# Patient Record
Sex: FEMALE | Employment: FULL TIME | URBAN - METROPOLITAN AREA
[De-identification: names, ages, dates, MRNs, and addresses within clinical notes are randomized per-mention and may not be internally consistent; named-entity substitution may affect disease eponyms.]

---

## 2017-02-09 ENCOUNTER — HOSPITAL ENCOUNTER (EMERGENCY)
Facility: CLINIC | Age: 42
Discharge: HOME | End: 2017-02-09
Attending: EMERGENCY MEDICINE | Admitting: EMERGENCY MEDICINE
Payer: COMMERCIAL

## 2017-02-09 ENCOUNTER — APPOINTMENT (OUTPATIENT)
Dept: GENERAL RADIOLOGY | Facility: CLINIC | Age: 42
End: 2017-02-09
Attending: EMERGENCY MEDICINE
Payer: COMMERCIAL

## 2017-02-09 VITALS
DIASTOLIC BLOOD PRESSURE: 84 MMHG | BODY MASS INDEX: 33.04 KG/M2 | HEIGHT: 61 IN | RESPIRATION RATE: 22 BRPM | SYSTOLIC BLOOD PRESSURE: 139 MMHG | TEMPERATURE: 98.9 F | OXYGEN SATURATION: 100 % | WEIGHT: 175 LBS | HEART RATE: 97 BPM

## 2017-02-09 DIAGNOSIS — J11.1 INFLUENZA-LIKE ILLNESS: ICD-10-CM

## 2017-02-09 LAB
FLUAV+FLUBV AG SPEC QL: NEGATIVE
FLUAV+FLUBV AG SPEC QL: NORMAL
SPECIMEN SOURCE: NORMAL

## 2017-02-09 PROCEDURE — 71020 XR CHEST 2 VW: CPT

## 2017-02-09 PROCEDURE — 87804 INFLUENZA ASSAY W/OPTIC: CPT | Performed by: EMERGENCY MEDICINE

## 2017-02-09 PROCEDURE — 99284 EMERGENCY DEPT VISIT MOD MDM: CPT | Performed by: EMERGENCY MEDICINE

## 2017-02-09 RX ORDER — CODEINE PHOSPHATE AND GUAIFENESIN 10; 100 MG/5ML; MG/5ML
2 SOLUTION ORAL EVERY 4 HOURS PRN
Qty: 120 ML | Refills: 0 | Status: SHIPPED | OUTPATIENT
Start: 2017-02-09 | End: 2019-12-02

## 2017-02-09 ASSESSMENT — ENCOUNTER SYMPTOMS
CHILLS: 1
ABDOMINAL PAIN: 1
SORE THROAT: 1
MYALGIAS: 1
COUGH: 1
VOMITING: 1

## 2017-02-09 NOTE — DISCHARGE INSTRUCTIONS
Discharge Instructions  Influenza-like Illness    You were diagnosed today with influenza like illness.  Influenza is a respiratory illness caused by influenza A or B viruses.  Influenza causes fever, headache, muscle aches, and fatigue.  These symptoms start one to four days after you have been around a person with this illness.  People with influenza commonly have a dry cough, sore throat, and a runny nose.   Influenza is spread through sneezing and coughing and can live on surfaces for several days.  It is usually contagious for 5 days but in some cases up to 10 days and often affects several family members. If you have a family member who is less than 2 years old, older than 65 years old, pregnant or has a serious medical condition, they should be seen right away by a physician to decide if they should take preventative medications.      Return to the Emergency Department if:    You have trouble breathing.    You develop pain in your chest.    You have signs of being dehydrated, such as dizziness or unable to urinate at least three times daily.    You feel confused.    You cannot stop vomiting or you cannot drink enough fluids.    In children, you should seek help if the child has any of the above or if child:    Has blue or purplish skin color.    Is so irritable that he or she does not want to be held.    Does not have tears when crying (in infants) or does not urinate at least three times daily.    Does not wake up easily.    Follow-up with your doctor if you are not improving after 5 days.    What can I do to help myself?    Rest.    Fluids -- Drink hydrating solutions such as Gatorade  or Pedialyte  as often as you can. If you are drinking enough, you should pass urine at least every eight hours.    Tylenol  (acetaminophen) and Advil  (ibuprofen) can relieve fever, headache, and muscle aches. Do not give aspirin to children under 18 years old.     Antiviral treatment -- Antiviral medicines do not make the  flu symptoms go away immediately.  They have only been shown to make the symptoms go away 12 to 24 hours sooner than they would without treatment.       Antibiotics -- Antibiotics are NOT useful for treating viral illnesses such as influenza. Antibiotics should only be used if there is a bacterial complication of the flu such as bacterial pneumonia, ear infection, or sinusitis.    Because you were diagnosed with a flu like illness you are very contagious.  This means you cannot work, attend school or  for at least 24 hours or until you no longer have a fever.  If you were given a prescription for medicine here today, be sure to read all of the information (including the package insert) that comes with your prescription.  This will include important information about the medicine, its side effects, and any warnings that you need to know about.  The pharmacist who fills the prescription can provide more information and answer questions you may have about the medicine.  If you have questions or concerns that the pharmacist cannot address, please call or return to the Emergency Department.   Opioid Medication Information    Pain medications are among the most commonly prescribed medicines, so we are including this information for all our patients. If you did not receive pain medication or get a prescription for pain medicine, you can ignore it.     You may have been given a prescription for an opioid (narcotic) pain medicine and/or have received a pain medicine while here in the Emergency Department. These medicines can make you drowsy or impaired. You must not drive, operate dangerous equipment, or engage in any other dangerous activities while taking these medications. If you drive while taking these medications, you could be arrested for DUI, or driving under the influence. Do not drink any alcohol while you are taking these medications.     Opioid pain medications can cause addiction. If you have a history of  chemical dependency of any type, you are at a higher risk of becoming addicted to pain medications.  Only take these prescribed medications to treat your pain when all other options have been tried. Take it for as short a time and as few doses as possible. Store your pain pills in a secure place, as they are frequently stolen and provide a dangerous opportunity for children or visitors in your house to start abusing these powerful medications. We will not replace any lost or stolen medicine.  As soon as your pain is better, you should flush all your remaining medication.     Many prescription pain medications contain Tylenol  (acetaminophen), including Vicodin , Tylenol #3 , Norco , Lortab , and Percocet .  You should not take any extra pills of Tylenol  if you are using these prescription medications or you can get very sick.  Do not ever take more than 3000 mg of acetaminophen in any 24 hour period.    All opioids tend to cause constipation. Drink plenty of water and eat foods that have a lot of fiber, such as fruits, vegetables, prune juice, apple juice and high fiber cereal.  Take a laxative if you don t move your bowels at least every other day. Miralax , Milk of Magnesia, Colace , or Senna  can be used to keep you regular.      Remember that you can always come back to the Emergency Department if you are not able to see your regular doctor in the amount of time listed above, if you get any new symptoms, or if there is anything that worries you.

## 2017-02-09 NOTE — ED AVS SNAPSHOT
[facility]    [address]    [facility] [address]    Phone:  [phone]    Fax:  [phone]                                       Mikki Knox   MRN: [id number]    Department:  [facility]   Date of Visit:  2/9/2017           After Visit Summary Signature Page     I have received my discharge instructions, and my questions have been answered. I have discussed any challenges I see with this plan with the nurse or doctor.    ..........................................................................................................................................  Patient/Patient Representative Signature      ..........................................................................................................................................  Patient Representative Print Name and Relationship to Patient    ..................................................               ................................................  Date                                            Time    ..........................................................................................................................................  Reviewed by Signature/Title    ...................................................              ..............................................  Date                                                            Time

## 2017-02-09 NOTE — ED NOTES
Cough for the past 4 days.  Vomiting with coughing.  Patient alert and oriented x3.  Airway, breathing and circulation intact.

## 2017-02-09 NOTE — ED AVS SNAPSHOT
[facility]    [address]    [facility] [address]    Phone:  [phone]    Fax:  [phone]                                       Mikki Knox   MRN: [id number]    Department:  [facility]   Date of Visit:  2/9/2017           Patient Information     Date Of Birth          1975        Your diagnoses for this visit were:     Influenza-like illness        You were seen by Francisco Chamberlain MD.      Follow-up Information     Follow up with [facility] In 3 days.    Specialties:  Sports Medicine, Pain Management, Obstetrics & Gynecology, Pediatrics, Internal Medicine, Nephrology    Contact information:    [address]  [facility] [address] [phone]        Discharge Instructions       Discharge Instructions  Influenza-like Illness    You were diagnosed today with influenza like illness.  Influenza is a respiratory illness caused by influenza A or B viruses.  Influenza causes fever, headache, muscle aches, and fatigue.  These symptoms start one to four days after you have been around a person with this illness.  People with influenza commonly have a dry cough, sore throat, and a runny nose.   Influenza is spread through sneezing and coughing and can live on surfaces for several days.  It is usually contagious for 5 days but in some cases up to 10 days and often affects several family members. If you have a family member who is less than 2 years old, older than 65 years old, pregnant or has a serious medical condition, they should be seen right away by a physician to decide if they should take preventative medications.      Return to the Emergency Department if:    You have trouble breathing.    You develop pain in your chest.    You have signs of being dehydrated, such as dizziness or unable to urinate at least three times daily.    You feel confused.    You cannot stop  vomiting or you cannot drink enough fluids.    In children, you should seek help if the child has any of the above or if child:    Has blue or purplish skin color.    Is so irritable that he or she does not want to be held.    Does not have tears when crying (in infants) or does not urinate at least three times daily.    Does not wake up easily.    Follow-up with your doctor if you are not improving after 5 days.    What can I do to help myself?    Rest.    Fluids -- Drink hydrating solutions such as Gatorade  or Pedialyte  as often as you can. If you are drinking enough, you should pass urine at least every eight hours.    Tylenol  (acetaminophen) and Advil  (ibuprofen) can relieve fever, headache, and muscle aches. Do not give aspirin to children under 18 years old.     Antiviral treatment -- Antiviral medicines do not make the flu symptoms go away immediately.  They have only been shown to make the symptoms go away 12 to 24 hours sooner than they would without treatment.       Antibiotics -- Antibiotics are NOT useful for treating viral illnesses such as influenza. Antibiotics should only be used if there is a bacterial complication of the flu such as bacterial pneumonia, ear infection, or sinusitis.    Because you were diagnosed with a flu like illness you are very contagious.  This means you cannot work, attend school or  for at least 24 hours or until you no longer have a fever.  If you were given a prescription for medicine here today, be sure to read all of the information (including the package insert) that comes with your prescription.  This will include important information about the medicine, its side effects, and any warnings that you need to know about.  The pharmacist who fills the prescription can provide more information and answer questions you may have about the medicine.  If you have questions or concerns that the pharmacist cannot address, please call or return to the Emergency  Department.   Opioid Medication Information    Pain medications are among the most commonly prescribed medicines, so we are including this information for all our patients. If you did not receive pain medication or get a prescription for pain medicine, you can ignore it.     You may have been given a prescription for an opioid (narcotic) pain medicine and/or have received a pain medicine while here in the Emergency Department. These medicines can make you drowsy or impaired. You must not drive, operate dangerous equipment, or engage in any other dangerous activities while taking these medications. If you drive while taking these medications, you could be arrested for DUI, or driving under the influence. Do not drink any alcohol while you are taking these medications.     Opioid pain medications can cause addiction. If you have a history of chemical dependency of any type, you are at a higher risk of becoming addicted to pain medications.  Only take these prescribed medications to treat your pain when all other options have been tried. Take it for as short a time and as few doses as possible. Store your pain pills in a secure place, as they are frequently stolen and provide a dangerous opportunity for children or visitors in your house to start abusing these powerful medications. We will not replace any lost or stolen medicine.  As soon as your pain is better, you should flush all your remaining medication.     Many prescription pain medications contain Tylenol  (acetaminophen), including Vicodin , Tylenol #3 , Norco , Lortab , and Percocet .  You should not take any extra pills of Tylenol  if you are using these prescription medications or you can get very sick.  Do not ever take more than 3000 mg of acetaminophen in any 24 hour period.    All opioids tend to cause constipation. Drink plenty of water and eat foods that have a lot of fiber, such as fruits, vegetables, prune juice, apple juice and high fiber cereal.   Take a laxative if you don t move your bowels at least every other day. Miralax , Milk of Magnesia, Colace , or Senna  can be used to keep you regular.      Remember that you can always come back to the Emergency Department if you are not able to see your regular doctor in the amount of time listed above, if you get any new symptoms, or if there is anything that worries you.        24 Hour Appointment Hotline       To make an appointment at any [facility], call [phone] ([phone]). If you don't have a family doctor or clinic, we will help you find one. [facility] clinics are conveniently located to serve the needs of you and your family.             Review of your medicines      START taking        Dose / Directions Last dose taken    guaiFENesin-codeine 100-10 MG/5ML Soln solution   Commonly known as:  ROBITUSSIN AC   Dose:  2 tsp.   Quantity:  120 mL        Take 10 mLs by mouth every 4 hours as needed for cough   Refills:  0          Our records show that you are taking the medicines listed below. If these are incorrect, please call your family doctor or clinic.        Dose / Directions Last dose taken    Adioso CONTOUR test strip   Quantity:  100 strip   Generic drug:  blood glucose monitoring        Use to test blood sugars 3 times daily or as directed.   Refills:  prn        bismuth subsalicylate 262 MG chewable tablet   Commonly known as:  PEPTO BISMOL   Dose:  262 mg   Quantity:  30 tablet        Take 1 tablet (262 mg) by mouth 4 times daily as needed   Refills:  0        ibuprofen 600 MG tablet   Commonly known as:  ADVIL/MOTRIN   Dose:  600 mg   Quantity:  120 tablet        Take 1 tablet by mouth 4 times daily.   Refills:  1        metFORMIN 1000 MG tablet   Commonly known as:  GLUCOPHAGE   Dose:  1000 mg   Quantity:  60 tablet        Take 1 tablet (1,000 mg) by mouth 2 times daily (with meals)   Refills:  0        metoclopramide 10 MG tablet   Commonly known as:  REGLAN   Dose:  10 mg    Quantity:  15 tablet        Take 1 tablet (10 mg) by mouth 4 times daily as needed   Refills:  0        triamcinolone 0.1 % ointment   Commonly known as:  KENALOG   Quantity:  15 g        Apply sparingly to affected area. Use up to three times a day to rash on right hand   Refills:  0                Prescriptions were sent or printed at these locations (1 Prescription)                   Other Prescriptions                Printed at Department/Unit printer (1 of 1)         guaiFENesin-codeine (ROBITUSSIN AC) 100-10 MG/5ML SOLN solution                Procedures and tests performed during your visit     Influenza A/B antigen    XR Chest 2 Views      Orders Needing Specimen Collection     None      Pending Results     No orders found from 2/8/2017 to 2/10/2017.            Pending Culture Results     No orders found from 2/8/2017 to 2/10/2017.       Test Results from your hospital stay           2/9/2017  3:46 PM - Interface, Flexilab Results      Component Results     Component Value Ref Range & Units Status    Influenza A/B Agn Specimen Nasal  Final    Influenza A Negative NEG Final    Influenza B  NEG Final    Negative   Test results must be correlated with clinical data. If necessary, results   should be confirmed by a molecular assay or viral culture.           2/9/2017  3:22 PM - Interface, Radiant Ib      Narrative     XR CHEST 2 VW 2/9/2017 3:20 PM    COMPARISON: None.    HISTORY: Chest pain        Impression     IMPRESSION: Cardiac silhouette and pulmonary vasculature are within  normal limits. No focal airspace disease, pleural effusion or  pneumothorax.    JOEY WHYTE                Clinical Quality Measure: Blood Pressure Screening     Your blood pressure was checked while you were in the emergency department today. The last reading we obtained was  BP: 139/84 mmHg . Please read the guidelines below about what these numbers mean and what you should do about them.  If your systolic blood pressure (the top  "number) is less than 120 and your diastolic blood pressure (the bottom number) is less than 80, then your blood pressure is normal. There is nothing more that you need to do about it.  If your systolic blood pressure (the top number) is 120-139 or your diastolic blood pressure (the bottom number) is 80-89, your blood pressure may be higher than it should be. You should have your blood pressure rechecked within a year by a primary care provider.  If your systolic blood pressure (the top number) is 140 or greater or your diastolic blood pressure (the bottom number) is 90 or greater, you may have high blood pressure. High blood pressure is treatable, but if left untreated over time it can put you at risk for heart attack, stroke, or kidney failure. You should have your blood pressure rechecked by a primary care provider within the next 4 weeks.  If your provider in the emergency department today gave you specific instructions to follow-up with your doctor or provider even sooner than that, you should follow that instruction and not wait for up to 4 weeks for your follow-up visit.        Thank you for choosing [facility]       Thank you for choosing [facility] for your care. Our goal is always to provide you with excellent care. Hearing back from our patients is one way we can continue to improve our services. Please take a few minutes to complete the written survey that you may receive in the mail after you visit with us. Thank you!        [employer] Information     [employer] lets you send messages to your doctor, view your test results, renew your prescriptions, schedule appointments and more. To sign up, go to [email or web address] . Click on \"Log in\" on the left side of the screen, which will take you to the Welcome page. Then click on \"Sign up Now\" on the right side of the page.     You will be asked to enter the access code listed below, as well as some personal information. Please follow the directions to create your " username and password.     Your access code is: [email or web address]  Expires: 3/7/2017  6:54 PM     Your access code will  in 90 days. If you need help or a new code, please call your [facility] clinic or [phone].        Care EveryWhere ID     This is your Care EveryWhere ID. This could be used by other organizations to access your [facility] medical records  [id number]        After Visit Summary       This is your record. Keep this with you and show to your community pharmacist(s) and doctor(s) at your next visit.

## 2017-02-09 NOTE — ED PROVIDER NOTES
"  History     Chief Complaint:  Cough    HPI   Mikki Knox is a 41 year old female with a PMH significant for diabetes who presents to the emergency department for evaluation of cough. The patient reports onset of a dry cough three days ago for which she has been taking OTC medications without significant relief. She also describes associated chills, myalgias, sore throat, ear fullness, and today onset of post-tussive emesis. The patient reports having pain to her chest and back whenever she coughs. She says her blood sugars have been fluctuating, but \"not out of control\", since starting the OTC medications. Her sugars normally are around 150 but have been as high as 245 lately.     Allergies:  No Known Drug Allergies    Medications:    Metformin  Reglan  Pepto bismol  Motrin    Past Medical History:    Diabetes mellitus  Hyperlipidemia  Ovarian cyst    Past Surgical History:    Appendectomy    Family History:    Diabetes  Hypertension  Cancer  Heart disease    Social History:   Tobacco use:    Negative  Alcohol use:    Positive  Marital status:      Accompanied to ED by:  Alone    Review of Systems   Constitutional: Positive for chills.   HENT: Positive for sore throat.         Positive for ear fullness.   Respiratory: Positive for cough.    Cardiovascular: Positive for chest pain.   Gastrointestinal: Positive for vomiting (post-tussive) and abdominal pain.   Musculoskeletal: Positive for myalgias.   All other systems reviewed and are negative.    Physical Exam   First Vitals:  BP: 139/84 mmHg  Pulse: 97  Heart Rate: 97  Temp: 98.9  F (37.2  C)  Resp: 22  Height: 154.9 cm (5' 1\")  Weight: 79.379 kg (175 lb)  SpO2: 100 %      Physical Exam  Constitutional: Alert, attentive  HENT:    Nose normal.    Posterior pharynx shows no erythema/edema   TMs clear bilaterally; normal external canals and external ears   Normal midline uvula   No peritonsillar erythema or swelling   No sublingual induration, swelling or " tenderness   No trismus   Normal dentition without gingival swelling or caries   Able to extend neck without discomfort   Tolerating secretions and able to breathe supine with ease  Eyes: EOM are normal. Pupils are equal, round, and reactive to light.   CV: regular rate and rhythm; no murmurs, rubs or gallups  Chest: Effort normal and breath sounds normal.   GI:  There is no tenderness. No distension. Normal bowel sounds  MSK: Normal range of motion.   Neurological: Alert, attentive  Skin: Skin is warm and dry.      Emergency Department Course     Imaging:  Radiographic findings were communicated with the patient who voiced understanding of the findings.  XR chest:  Cardiac silhouette and pulmonary vasculature are within normal limits. No focal airspace disease, pleural effusion or pneumothorax.  Radiology report.     Laboratory:  Influenza A/B Antigen: Negative     Emergency Department Course:  Nursing notes and vitals reviewed.   1448: I performed an exam of the patient as documented above..   The above workup was undertaken.   I personally reviewed the laboratory results with the Patient and answered all related questions prior to discharge.   Findings and plan explained to the Patient. Patient discharged home with instructions regarding supportive care, medications, and reasons to return. The importance of close follow-up was reviewed.     Impression & Plan      Medical Decision Making:  This is a very well appearing 41 year old female who presents with history and exam consistent with JELANI vs influenza vs PNA, with negative influenza swab. Given her chest pain and cough, CXR was performed and is negative. There is no evidence of acute otitis media. The patient is afebrile in the department. She is well-hydrated, well-appearing, and I believe is safe for discharge with plan for supportive care. The patient may take Tylenol or ibuprofen for pain and fever. Return if increasing pain, fever, difficulty breathing,  vomiting, or any other concerns. Follow-up with primary physician in 3-5 days.    Diagnosis:    ICD-10-CM    1. Influenza-like illness R69        Disposition:  Discharged to home.     Discharge Medications:  New Prescriptions    GUAIFENESIN-CODEINE (ROBITUSSIN AC) 100-10 MG/5ML SOLN SOLUTION    Take 10 mLs by mouth every 4 hours as needed for cough       IGordon, am serving as a scribe at 2:48 PM on 2/9/2017 to document services personally performed by Dr. Chamberlain, based on my observations and the provider's statements to me.  Gordon Ramirez  2/9/2017   [facility] EMERGENCY DEPARTMENT        Francisco Chamberlain MD  02/09/17 [id number]

## 2017-02-11 LAB
ALBUMIN SERPL-MCNC: 3.9 G/DL (ref 3.4–5)
ALP SERPL-CCNC: 66 U/L (ref 40–150)
ALT SERPL W P-5'-P-CCNC: 92 U/L (ref 0–50)
ANION GAP SERPL CALCULATED.3IONS-SCNC: 8 MMOL/L (ref 3–14)
AST SERPL W P-5'-P-CCNC: 59 U/L (ref 0–45)
BASOPHILS # BLD AUTO: 0.1 10E9/L (ref 0–0.2)
BASOPHILS NFR BLD AUTO: 1.1 %
BILIRUB SERPL-MCNC: 0.3 MG/DL (ref 0.2–1.3)
BUN SERPL-MCNC: 8 MG/DL (ref 7–30)
CALCIUM SERPL-MCNC: 8.9 MG/DL (ref 8.5–10.1)
CHLORIDE SERPL-SCNC: 100 MMOL/L (ref 94–109)
CO2 SERPL-SCNC: 28 MMOL/L (ref 20–32)
CREAT SERPL-MCNC: 0.6 MG/DL (ref 0.52–1.04)
DIFFERENTIAL METHOD BLD: NORMAL
EOSINOPHIL # BLD AUTO: 0.2 10E9/L (ref 0–0.7)
EOSINOPHIL NFR BLD AUTO: 3.2 %
ERYTHROCYTE [DISTWIDTH] IN BLOOD BY AUTOMATED COUNT: 11.9 % (ref 10–15)
GFR SERPL CREATININE-BSD FRML MDRD: ABNORMAL ML/MIN/1.7M2
GLUCOSE BLDC GLUCOMTR-MCNC: 277 MG/DL (ref 70–99)
GLUCOSE SERPL-MCNC: 248 MG/DL (ref 70–99)
HCT VFR BLD AUTO: 42.9 % (ref 35–47)
HGB BLD-MCNC: 14.7 G/DL (ref 11.7–15.7)
IMM GRANULOCYTES # BLD: 0 10E9/L (ref 0–0.4)
IMM GRANULOCYTES NFR BLD: 0.2 %
LYMPHOCYTES # BLD AUTO: 2.3 10E9/L (ref 0.8–5.3)
LYMPHOCYTES NFR BLD AUTO: 40.7 %
MCH RBC QN AUTO: 29 PG (ref 26.5–33)
MCHC RBC AUTO-ENTMCNC: 34.3 G/DL (ref 31.5–36.5)
MCV RBC AUTO: 85 FL (ref 78–100)
MONOCYTES # BLD AUTO: 0.7 10E9/L (ref 0–1.3)
MONOCYTES NFR BLD AUTO: 11.9 %
NEUTROPHILS # BLD AUTO: 2.4 10E9/L (ref 1.6–8.3)
NEUTROPHILS NFR BLD AUTO: 42.9 %
NRBC # BLD AUTO: 0 10*3/UL
NRBC BLD AUTO-RTO: 0 /100
PLATELET # BLD AUTO: 285 10E9/L (ref 150–450)
POTASSIUM SERPL-SCNC: 3.6 MMOL/L (ref 3.4–5.3)
PROT SERPL-MCNC: 7.8 G/DL (ref 6.8–8.8)
RBC # BLD AUTO: 5.07 10E12/L (ref 3.8–5.2)
SODIUM SERPL-SCNC: 136 MMOL/L (ref 133–144)
WBC # BLD AUTO: 5.6 10E9/L (ref 4–11)

## 2017-02-11 PROCEDURE — 96374 THER/PROPH/DIAG INJ IV PUSH: CPT

## 2017-02-11 PROCEDURE — 85025 COMPLETE CBC W/AUTO DIFF WBC: CPT | Performed by: EMERGENCY MEDICINE

## 2017-02-11 PROCEDURE — 25000128 H RX IP 250 OP 636: Performed by: EMERGENCY MEDICINE

## 2017-02-11 PROCEDURE — 80053 COMPREHEN METABOLIC PANEL: CPT | Performed by: EMERGENCY MEDICINE

## 2017-02-11 PROCEDURE — 00000146 ZZHCL STATISTIC GLUCOSE BY METER IP

## 2017-02-11 PROCEDURE — 99284 EMERGENCY DEPT VISIT MOD MDM: CPT

## 2017-02-11 RX ORDER — ONDANSETRON 2 MG/ML
4 INJECTION INTRAMUSCULAR; INTRAVENOUS EVERY 30 MIN PRN
Status: DISCONTINUED | OUTPATIENT
Start: 2017-02-11 | End: 2017-02-12 | Stop reason: HOSPADM

## 2017-02-11 RX ORDER — SODIUM CHLORIDE 9 MG/ML
1000 INJECTION, SOLUTION INTRAVENOUS CONTINUOUS
Status: DISCONTINUED | OUTPATIENT
Start: 2017-02-11 | End: 2017-02-12 | Stop reason: HOSPADM

## 2017-02-11 RX ADMIN — SODIUM CHLORIDE 1000 ML: 9 INJECTION, SOLUTION INTRAVENOUS at 23:36

## 2017-02-11 RX ADMIN — ONDANSETRON 4 MG: 2 INJECTION INTRAMUSCULAR; INTRAVENOUS at 23:36

## 2017-02-12 ENCOUNTER — HOSPITAL ENCOUNTER (EMERGENCY)
Facility: CLINIC | Age: 42
Discharge: HOME | End: 2017-02-12
Attending: EMERGENCY MEDICINE | Admitting: EMERGENCY MEDICINE
Payer: COMMERCIAL

## 2017-02-12 VITALS
WEIGHT: 174.82 LBS | TEMPERATURE: 98.6 F | BODY MASS INDEX: 33.05 KG/M2 | HEART RATE: 95 BPM | RESPIRATION RATE: 16 BRPM | SYSTOLIC BLOOD PRESSURE: 124 MMHG | OXYGEN SATURATION: 98 % | DIASTOLIC BLOOD PRESSURE: 90 MMHG

## 2017-02-12 DIAGNOSIS — E86.0 DEHYDRATION: ICD-10-CM

## 2017-02-12 DIAGNOSIS — K52.9 ACUTE GASTROENTERITIS: ICD-10-CM

## 2017-02-12 LAB
DEPRECATED S PYO AG THROAT QL EIA: NORMAL
MICRO REPORT STATUS: NORMAL
SPECIMEN SOURCE: NORMAL

## 2017-02-12 PROCEDURE — 25000128 H RX IP 250 OP 636: Performed by: EMERGENCY MEDICINE

## 2017-02-12 PROCEDURE — 87880 STREP A ASSAY W/OPTIC: CPT | Performed by: EMERGENCY MEDICINE

## 2017-02-12 PROCEDURE — 87081 CULTURE SCREEN ONLY: CPT | Performed by: EMERGENCY MEDICINE

## 2017-02-12 RX ADMIN — SODIUM CHLORIDE 1000 ML: 9 INJECTION, SOLUTION INTRAVENOUS at 01:18

## 2017-02-12 NOTE — ED NOTES
"Seen Thursday for cough, home with robitussin with codeine. Emesis x 6 today, concerned with blood glucose 300 at home. Pt states \"I nearly passed out while vomiting at my house\". ABCD's intact.   Blood sugar in triage = 277  "

## 2017-02-12 NOTE — ED PROVIDER NOTES
CHIEF COMPLAINT:  Sore throat, vomiting and diarrhea.      HISTORY OF PRESENT ILLNESS:  Mikki Knox is a pleasant 41-year-old female who presents with 2 days of cough.  She was seen in the emergency department several days ago on 02/02/2017 with negative chest x-ray, influenza and has been using over-the-counter cough suppressants.  She developed vomiting and diarrhea 1 day ago.  She has had a temperature as high as 101 degrees.  She feels dizzy with standing.  There has been no blood in her vomit or blood in her stool.  She is diabetic and her blood sugars have been running 300.  No rash.  No trauma.  No chest pain.  No shortness of breath.  She has no other complaints at this time.      PAST MEDICAL HISTORY:  Diabetes mellitus.      PAST SURGICAL HISTORY:  Appendectomy.      MEDICATIONS:     1.  Robitussin AC.   2.  Metformin.   3.  Reglan.   4.  Kenalog ointment.      ALLERGIES:  None.      SOCIAL HISTORY:  The patient presents to the emergency department with her .  She does not smoke.      REVIEW OF SYSTEMS:  A pertinent 10-point review of systems is negative except for that noted in the HPI.      PHYSICAL EXAMINATION:   IN GENERAL:  The patient is tired appearing, but appropriate with interaction.   VITAL SIGNS:  Blood pressure 140/90, heart rate 95, respiratory rate 18, oxygen 100% on room air, temperature 98.6 temporally.   HEENT:  Dry mucous membranes.  Pupils equal, round, reactive to light.  Her TMs are normal and her posterior oropharynx shows erythema without abscess or exudate.    NECK:  Full range of motion.   CARDIOVASCULAR:  Regular rhythm.  Normal rate.  No murmurs.   RESPIRATORY:  Clear to auscultation bilaterally without wheezes, rales or rhonchi.   GASTROINTESTINAL:  Soft, nondistended, nontender, normal bowel sounds.   MUSCULOSKELETAL:  Full range of motion of all extremities.   SKIN:  There are no pertinent skin findings.   NEUROLOGIC:  The patient is alert, oriented x4.  She has normal  strength.   PSYCHIATRIC:  The patient has a normal mood and affect.      LABORATORY EVALUATION AND DIAGNOSTIC STUDIES:    A Rapid Strep screen is negative.      A CBC shows a white blood cell count of 5.6, hemoglobin 14.7, platelets 285, otherwise unremarkable.  Comprehensive metabolic panel is normal with the exception of a slightly elevated glucose at 248 and ALT of 92 and AST of 59.      A bedside glucose reading was 277.      EMERGENCY DEPARTMENT COURSE AND MEDICAL DECISION MAKING:  This is a pleasant 41-year-old female who presents with vomiting and diarrheal illness consistent with gastroenteritis.  She is clinically dehydrated and after 2 liters of IV fluids using normal saline she feels much better.  She also received 4 mg of Zofran.  Strep is negative and with regards to her pharyngitis, this likely represents a virus.  No concern clinically for peritonsillar abscess, epiglottitis, Michael's angina or deep space tissue infection.  She will be discharged home with Zofran to use as needed with appropriate return precautions discussed.      DIAGNOSES:   1.  Acute gastroenteritis.   2.  Dehydration.   3.  Viral pharyngitis.      PLAN OF CARE:  Discharge home with primary care followup.         ASH COLEMAN MD             D: 2017 02:06   T: 2017 07:30   MT: LIZ#[id number]      Name:     MALIKA THOMPSON   MRN:      [id number]        Account:      [id number]   :      1975           Visit Date:   2017      Document: [id number]

## 2017-02-14 LAB
BACTERIA SPEC CULT: NORMAL
MICRO REPORT STATUS: NORMAL
SPECIMEN SOURCE: NORMAL

## 2018-05-20 ENCOUNTER — HOSPITAL ENCOUNTER (EMERGENCY)
Facility: CLINIC | Age: 43
Discharge: HOME | End: 2018-05-20
Attending: PHYSICIAN ASSISTANT | Admitting: PHYSICIAN ASSISTANT
Payer: COMMERCIAL

## 2018-05-20 VITALS
TEMPERATURE: 97.6 F | SYSTOLIC BLOOD PRESSURE: 161 MMHG | RESPIRATION RATE: 20 BRPM | DIASTOLIC BLOOD PRESSURE: 83 MMHG | HEART RATE: 100 BPM | OXYGEN SATURATION: 99 %

## 2018-05-20 DIAGNOSIS — R68.84 JAW PAIN: ICD-10-CM

## 2018-05-20 PROCEDURE — 25000132 ZZH RX MED GY IP 250 OP 250 PS 637: Performed by: PHYSICIAN ASSISTANT

## 2018-05-20 PROCEDURE — 99283 EMERGENCY DEPT VISIT LOW MDM: CPT | Mod: 25

## 2018-05-20 PROCEDURE — 64400 NJX AA&/STRD TRIGEMINAL NRV: CPT

## 2018-05-20 RX ORDER — PENICILLIN V POTASSIUM 500 MG/1
500 TABLET, FILM COATED ORAL 4 TIMES DAILY
Qty: 40 TABLET | Refills: 0 | Status: SHIPPED | OUTPATIENT
Start: 2018-05-20 | End: 2018-05-30

## 2018-05-20 RX ORDER — BUPIVACAINE HYDROCHLORIDE AND EPINEPHRINE 5; 5 MG/ML; UG/ML
1.8 INJECTION, SOLUTION PERINEURAL ONCE
Status: DISCONTINUED | OUTPATIENT
Start: 2018-05-20 | End: 2018-05-20 | Stop reason: HOSPADM

## 2018-05-20 RX ORDER — IBUPROFEN 600 MG/1
600 TABLET, FILM COATED ORAL ONCE
Status: COMPLETED | OUTPATIENT
Start: 2018-05-20 | End: 2018-05-20

## 2018-05-20 RX ORDER — HYDROCODONE BITARTRATE AND ACETAMINOPHEN 5; 325 MG/1; MG/1
2 TABLET ORAL ONCE
Status: COMPLETED | OUTPATIENT
Start: 2018-05-20 | End: 2018-05-20

## 2018-05-20 RX ADMIN — HYDROCODONE BITARTRATE AND ACETAMINOPHEN 2 TABLET: 5; 325 TABLET ORAL at 18:14

## 2018-05-20 RX ADMIN — IBUPROFEN 600 MG: 600 TABLET ORAL at 18:14

## 2018-05-20 NOTE — DISCHARGE INSTRUCTIONS
Discharge Instructions  Dental Pain    You have been seen today for a toothache. Your pain may be caused by an exposed nerve, an infection (pulpitis), a root abscess (pocket of pus), or other problems. You will need to see a dentist for a solution to your tooth problem. Emergency Department care is only to help control your problem until you can see a dentist; we cannot provide complete dental care.  Today, we did not find any sign that your toothache was caused by any dangerous or life-threatening condition, but sometimes symptoms develop over time and cannot be found during an emergency visit, so it is very important that you follow up with your dentist.      Generally, every Emergency Department visit should have a follow-up clinic visit with either a primary or a specialty clinic/provider. Please follow-up as instructed by your emergency provider today.    Return to the Emergency Department if:    You develop a new fever over 100.4 F.    You cannot open your mouth normally, cannot move your tongue well, or cannot swallow.    You have new or increased swelling of your face or neck.    You develop drainage of pus or foul smelling material from around your tooth.  What can I do to help myself?    Take any antibiotic the provider may have prescribed for you today.    Avoid very hot or very cold foods as both can cause pain.    Make an appointment to see a dentist as soon as possible. Dentists are generally not  on-staff  at hospitals so we cannot  refer  to you to dentist but we may be able to provide a list of dental clinics to help you.  If you were given a prescription for medicine here today, be sure to read all of the information (including the package insert) that comes with your prescription.  This will include important information about the medicine, its side effects, and any warnings that you need to know about.  The pharmacist who fills the prescription can provide more information and answer questions  you may have about the medicine.  If you have questions or concerns that the pharmacist cannot address, please call or return to the Emergency Department.   Remember that you can always come back to the Emergency Department if you are not able to see your regular provider in the amount of time listed above, if you get any new symptoms, or if there is anything that worries you.        Discharge Instructions  Dental Pain    You have been seen today for a toothache. Your pain may be caused by an exposed nerve, an infection (pulpitis), a root abscess, or other problems. You will need to see a dentist for a solution to your tooth problem. Emergency Department care is only to help control your problem until you can see a dentist.  Today, we did not find any sign that your toothache was caused by a serious condition, but sometimes symptoms develop over time and cannot be found during an emergency visit, so it is very important that you follow up with your dentist.      Return to the Emergency Department if:    You develop a fever over 101 degrees Fahrenheit    You can t open your mouth normally, can t move your tongue well, or can t swallow    You have new or increased swelling of your face or neck.    You develop drainage of pus or foul smelling material from around your tooth.  What can I do to help myself?    Take any antibiotic the doctor may have prescribed for you today.    Avoid very hot or very cold foods as both can cause pain.    Make an appointment to see a dentist as soon as possible. If you wish, we can provide you with a list of low-cost dental clinics.       Remember that you can always come back to the Emergency Department if you are not able to see your regular doctor in the amount of time listed above, if you get any new symptoms, or if there is anything that worries you.        Dental Resources  Name/Address/Phone Eligibility Hours Fee   [facility]  [address], [address]  [place] [address]  [phone] Anyone Call for appointment [facility]  Medical Assistance  Private Insurance   [facility]  [address] [place] [address]  [phone] Anyone Call for appointment    [facility] refers to low-cost dental clinics for non-preventive care    [place] Interpreters available Prices start at   Adults        Cleaning $36-$160        X-Ray $20-40  Children        Cleaning $15        X-ray $10-20        Fluoride $10  Accepts cash, check or credit;  Does not take insurance or MA.   [facility]  [address] [place]  [address]  [phone] Anyone Afternoons and evenings    September-May    Answers phones after 10 AM $30.00 per visit   ($15.00 per visit if 62 or older)   Preventive care.  Restoration care; sliding fee; MA   [facility]  [address] [place] [address]  [phone] Children birth to age 18 and pregnant women    [facility] Residents without insurance will be asked to apply for Assured Care. M TH F 8:30 am - 5 pm  T W 8:30 am - 7 pm    30 locations metro wide    Call for appointment and to confirm hours. Sliding Fee  [facility]  Medical Assistance  Assured Access  Private Insurance    8 Languages Spoken   [facility] [address] [address]  [phone] Anyone Call for appointment Sliding Fee  Accept insurance, [place],   [place] and self-pay.  Call if no insurance.    All services provided.  Staff fluent in Hmong, Laotian, Latvian, Kuwaiti, Chilean, Macanese, and Farsi.   [facility]  [address] [place] [address]  [phone] Children  Adults in a walk in basis Mon - Fri. 8 - 5 pm       (closed 1-2 pm)  General Dentists & Hygienists  Private Dentists  Dentures Fees based on family size and income ranging from 40% - 100% payment by patient.   [facility]  [address] [place]   [address] [phone] Anyone Mon - Fri 7:30 am - 5:00 pm  By Appt.    Tues & Wed @ 3:00 call for urgent care Appt for next day service Sliding fee:  MA; Insurance   [facility]  [address] [place]  [address]  [phone] Anyone Call for an appointment.  Days open vary every semester. Adult cleaning $25  Child cleaning $15  X-Rays $10-$15  Whitening services available  $75, includes cleaning  Seniors 50% off   [facility]  [address] [place]  [address]  [phone] Anyone M-F 8 am - 5 pm Most insurances accepted.  MA and Sliding Scale.   [facility]  [address] [place] [address]  [phone] Anyone without insurance Call to make appt   M-F 9:00 am - 5 pm   (Closed noon hour 12-1)    6 pm- 8 pm Evening hours also available for care Sliding fee based on income and size of household.   [facility]  [address] [place]  [address]  [phone] press option 1    For the [facility] press option 4 Anyone              Anyone Monday  4 - 6:30 pm  Tuesday 12:30 - 3 & 4-6:30  Thursday 8:30 - 11 am & 12-2:30 pm  September through May only    All year around on Thursdays from 5-9 pm (only time a dentist is in.) Cleanings & X-Rays Only  Cleanings:  Adults $30                   Kids $20  X-Rays:  Adults $34                Kids $10    MA and Sliding fee   [facility]  [address] [place] [address]    [phone] Anyone    ([employer] interpreters available) M-F 8:00 am - 5:00 pm       By appointment only  (same day appointments available) Sliding fee ($40+ may be due at appointment, remainder billed); MA; Insurance   [facility]  [address] [place] [address]    [phone]   Anyone    ([employer] interpreters available) M-Th 8:00 am - 8:00 pm  F 8:00 am - 5:00 pm    By appointment only  (same day  appointments available) Sliding fee ($40+ may be due at appointment, remainder billed); MA; Insurance   [facility]  [address] [place], [place]  [phone] Anyone    Must live within [facility] to qualify for sliding fee services Mon, Tues, Thurs, Fri  8:30 am - 5:00 pm  Wed. 8:30 am - 7:00 pm  All other services by appt. only Sliding Fee; MA   Offer payment plans    Have financial workers that will assist with [place] and will use sliding fee for those who do not qualify.      [facility]  [address] [place] [phone] Anyone without insurance     Hours and day of week vary  (Call ahead for hours)    Walk-in only Free Services    Cleanings; Fillings; Extractions   [facility]  [address] [place] [address]  [phone] Anyone Call for an appointment Accept patients with [place] and Medical Assistance.  10% discount if bill is paid in full on day of service.  No sliding fee scale.     [facility]  [address] [place], [place] [phone] Anyone M-F  8 am-5 pm  Call for appt.    Walk-in hours 8 am - 11am and 1 pm - 5 pm, however take scheduled appointments first    No emergency services or oral surgeries Sliding Fee available with an [place] or [place] denial letter and proof of income.    Accepts Assured Access card and MA coverage.     Name/Address/Phone Eligibility Hours Fee   [facility]  [address] [place]  [address] [phone] Anyone with emergencies only M & W clinic begins at 6 pm   Call ahead    Alternate Fridays for children by Appt only Free   [facility]  [address] [place] [address] [phone]    Emergencies (adults only):  [phone] Anyone Free walk-in screens for oral surgery    Call ahead for hours    All other services by appt. only  Accepts MA for pediatrics  only    Rates are about 25% - 40% less than private dental office.    No sliding fee scale   [facility]  [facility] [place], [place]  [phone] Anyone as long as they do not have health insurance Hours on Mondays, Tuesdays, and Thursdays Sliding fees based on monthly income    No root canals, tooth pulls or emergencies   [facility]  [address] [place] [phone] Anyone  M - F 8:00 am - 5:00 pm  Wed 8:00 am - 8 pm Sliding fees; MA; Insurance   [facility]  [address] [place] [address]  [phone] Anyone age 55+ M - F 8:00 am - 4:30 pm    Appt. only Set slightly lower fees;  MA; Insurance         Give Kids a smile day in [facility] Children Takes place in February at a few locations

## 2018-05-20 NOTE — LETTER
May 20, 2018      To Whom It May Concern:      Mikki Knox was seen in our Emergency Department today, 05/20/18.  I expect her condition to improve over the next day.  She may return to work/school when improved.    Sincerely,        Aide Esquivel RN

## 2018-05-20 NOTE — ED TRIAGE NOTES
Arrives with right upper dental pain since yesterday after drinking cold liquid that is radiating into her face and head, alert and oriented, ABCs intact.

## 2018-05-20 NOTE — ED AVS SNAPSHOT
[facility] Emergency Department    [address]    [facility] [address]    Phone:  [phone]    Fax:  [phone]                                       Mikki Knox   MRN: [id number]    Department:  [facility] Emergency Department   Date of Visit:  5/20/2018           Patient Information     Date Of Birth          1975        Your diagnoses for this visit were:     Jaw pain        You were seen by Bailey Bell PA-C.      Follow-up Information     Follow up with dentist In 1 day.    Why:  Recheck        Follow up with [facility] Emergency Department.    Specialty:  EMERGENCY MEDICINE    Why:  If symptoms worsen    Contact information:    [address]  [facility] [address] [phone]        Discharge Instructions       Discharge Instructions  Dental Pain    You have been seen today for a toothache. Your pain may be caused by an exposed nerve, an infection (pulpitis), a root abscess (pocket of pus), or other problems. You will need to see a dentist for a solution to your tooth problem. Emergency Department care is only to help control your problem until you can see a dentist; we cannot provide complete dental care.  Today, we did not find any sign that your toothache was caused by any dangerous or life-threatening condition, but sometimes symptoms develop over time and cannot be found during an emergency visit, so it is very important that you follow up with your dentist.      Generally, every Emergency Department visit should have a follow-up clinic visit with either a primary or a specialty clinic/provider. Please follow-up as instructed by your emergency provider today.    Return to the Emergency Department if:    You develop a new fever over 100.4 F.    You cannot open your mouth normally, cannot move your tongue well, or cannot swallow.    You have new or increased swelling of your face or neck.    You develop drainage of  pus or foul smelling material from around your tooth.  What can I do to help myself?    Take any antibiotic the provider may have prescribed for you today.    Avoid very hot or very cold foods as both can cause pain.    Make an appointment to see a dentist as soon as possible. Dentists are generally not  on-staff  at hospitals so we cannot  refer  to you to dentist but we may be able to provide a list of dental clinics to help you.  If you were given a prescription for medicine here today, be sure to read all of the information (including the package insert) that comes with your prescription.  This will include important information about the medicine, its side effects, and any warnings that you need to know about.  The pharmacist who fills the prescription can provide more information and answer questions you may have about the medicine.  If you have questions or concerns that the pharmacist cannot address, please call or return to the Emergency Department.   Remember that you can always come back to the Emergency Department if you are not able to see your regular provider in the amount of time listed above, if you get any new symptoms, or if there is anything that worries you.        Discharge Instructions  Dental Pain    You have been seen today for a toothache. Your pain may be caused by an exposed nerve, an infection (pulpitis), a root abscess, or other problems. You will need to see a dentist for a solution to your tooth problem. Emergency Department care is only to help control your problem until you can see a dentist.  Today, we did not find any sign that your toothache was caused by a serious condition, but sometimes symptoms develop over time and cannot be found during an emergency visit, so it is very important that you follow up with your dentist.      Return to the Emergency Department if:    You develop a fever over 101 degrees Fahrenheit    You can t open your mouth normally, can t move your tongue  well, or can t swallow    You have new or increased swelling of your face or neck.    You develop drainage of pus or foul smelling material from around your tooth.  What can I do to help myself?    Take any antibiotic the doctor may have prescribed for you today.    Avoid very hot or very cold foods as both can cause pain.    Make an appointment to see a dentist as soon as possible. If you wish, we can provide you with a list of low-cost dental clinics.       Remember that you can always come back to the Emergency Department if you are not able to see your regular doctor in the amount of time listed above, if you get any new symptoms, or if there is anything that worries you.        Dental Resources  Name/Address/Phone Eligibility Hours Fee   [facility]  [address], [address]  [place] [address]  [phone] Anyone Call for appointment [place]  Medical Assistance  Private Insurance   [facility]  [address] [place], [place] [phone] Anyone Call for appointment    [facility]"" refers to low-cost dental clinics for non-preventive care    Congolese Interpreters available Prices start at   Adults        Cleaning $36-$160        X-Ray $20-40  Children        Cleaning $15        X-ray $10-20        Fluoride $10  Accepts cash, check or credit;  Does not take insurance or MA.   [facility]  [address] [place]  [address]  [phone] Anyone Afternoons and evenings    September-May    Answers phones after 10 AM $30.00 per visit   ($15.00 per visit if 62 or older)   Preventive care.  Restoration care; sliding fee; MA   [facility]  [address] [place] [address]  [phone] Children birth to age 18 and pregnant women    [facility] Residents without insurance will be asked to apply for Assured Care. M TH F 8:30 am - 5 pm  T W 8:30 am - 7 pm    30 locations metro wide    Call for  appointment and to confirm hours. Sliding Fee  [place]  Medical Assistance  Assured Access  Private Insurance    8 Languages Spoken   [facility]  [address] [place] [address]  [phone] Anyone Call for appointment Sliding Fee  Accept insurance, [place],   [place] and self-pay.  Call if no insurance.    All services provided.  Staff fluent in Hmong, Laotian, Turkish, Italian, Emirati, Micronesian, and Farsi.   [facility]  [address] [place] [address]  [phone] Children  Adults in a walk in basis Mon - Fri. 8 - 5 pm       (closed 1-2 pm)  General Dentists & Hygienists  Private Dentists  Dentures Fees based on family size and income ranging from 40% - 100% payment by patient.   [facility]  [address] [place]  [address]    [phone] Anyone Mon - Fri 7:30 am - 5:00 pm  By Appt.    Tues & Wed @ 3:00 call for urgent care Appt for next day service Sliding fee:  MA; Insurance   [facility]  [address] [place]  [address]  [phone] Anyone Call for an appointment.  Days open vary every semester. Adult cleaning $25  Child cleaning $15  X-Rays $10-$15  Whitening services available  $75, includes cleaning  Seniors 50% off   [facility]  [address] [place]  [address]  [phone] Anyone M-F 8 am - 5 pm Most insurances accepted.  MA and Sliding Scale.   [facility]  [address] [place] [address]  [phone] Anyone without insurance Call to make appt   M-F 9:00 am - 5 pm   (Closed noon hour 12-1)    6 pm- 8 pm Evening hours also available for care Sliding fee based on income and size of household.   [facility]  [address] [place]  [address]  [phone] press option 1    For the [facility] press option 4 Anyone              Anyone  Monday  4 - 6:30 pm  Tuesday 12:30 - 3 & 4-6:30  Thursday 8:30 - 11 am & 12-2:30 pm  September through May only    All year around on Thursdays from 5-9 pm (only time a dentist is in.) Cleanings & X-Rays Only  Cleanings:  Adults $30                   Kids $20  X-Rays:  Adults $34                Kids $10    MA and Sliding fee   [facility]  [address] [place] [address]    [phone] Anyone    ([employer] interpreters available) M-F 8:00 am - 5:00 pm       By appointment only  (same day appointments available) Sliding fee ($40+ may be due at appointment, remainder billed); MA; Insurance   [facility]  [address] [place] [address]    [phone]   Anyone    ([employer] interpreters available) M-Th 8:00 am - 8:00 pm  F 8:00 am - 5:00 pm    By appointment only  (same day appointments available) Sliding fee ($40+ may be due at appointment, remainder billed); MA; Insurance   [facility]  [address] [place], [place]  [phone] Anyone    Must live within [facility] to qualify for sliding fee services Mon, Tues, Thurs, Fri  8:30 am - 5:00 pm  Wed. 8:30 am - 7:00 pm  All other services by appt. only Sliding Fee; MA   Offer payment plans    Have financial workers that will assist with [place] and will use sliding fee for those who do not qualify.      [facility]  [address] [place] [phone] Anyone without insurance     Hours and day of week vary  (Call ahead for hours)    Walk-in only Free Services    Cleanings; Fillings; Extractions   The [address]  [address] [phone] Anyone Call for an appointment Accept patients with [place] and Medical Assistance.  10% discount if bill is paid in full on day of service.  No sliding fee scale.     [facility]  [address] [place], [place] [phone] Anyone  M-F  8 am-5 pm  Call for appt.    Walk-in hours 8 am - 11am and 1 pm - 5 pm, however take scheduled appointments first    No emergency services or oral surgeries Sliding Fee available with an [place] or [place] denial letter and proof of income.    Accepts Assured Access card and [place] coverage.     Name/Address/Phone Eligibility Hours Fee   [facility]  [address] [place], [place]  [phone] Anyone with emergencies only M & W clinic begins at 6 pm   Call ahead    Alternate Fridays for children by Appt only Free   [facility]  [address] [place], [place] [address] [phone]    Emergencies (adults only):  [phone] Anyone Free walk-in screens for oral surgery    Call ahead for hours    All other services by appt. only  Accepts MA for pediatrics only    Rates are about 25% - 40% less than private dental office.    No sliding fee [facility] [address]  [phone] Anyone as long as they do not have health insurance Hours on Mondays, Tuesdays, and Thursdays Sliding fees based on monthly income    No root canals, tooth pulls or emergencies   [facility] [address] [address] [phone] Anyone  M - F 8:00 am - 5:00 pm  Wed 8:00 am - 8 pm Sliding fees; [place]; Insurance   [facility] [address]  [phone] Anyone age 55+ M - F 8:00 am - 4:30 pm    Appt. only Set slightly lower fees;  MA; Insurance         Give Kids a smile day in [facility] Children Takes place in February at a few locations           24 Hour Appointment Hotline       To make an appointment at any [facility], call [phone]). If you don't have a family doctor or clinic, we will help you find one. [facility] clinics are conveniently located to serve the needs of you and your family.             Review of your medicines       START taking        Dose / Directions Last dose taken    penicillin V potassium 500 MG tablet   Commonly known as:  VEETID   Dose:  500 mg   Quantity:  40 tablet        Take 1 tablet (500 mg) by mouth 4 times daily for 10 days   Refills:  0          Our records show that you are taking the medicines listed below. If these are incorrect, please call your family doctor or clinic.        Dose / Directions Last dose taken    guaiFENesin-codeine 100-10 MG/5ML Soln solution   Commonly known as:  ROBITUSSIN AC   Dose:  2 tsp.   Quantity:  120 mL        Take 10 mLs by mouth every 4 hours as needed for cough   Refills:  0        metFORMIN 1000 MG tablet   Commonly known as:  GLUCOPHAGE   Dose:  1000 mg   Quantity:  60 tablet        Take 1 tablet (1,000 mg) by mouth 2 times daily (with meals)   Refills:  0        metoclopramide 10 MG tablet   Commonly known as:  REGLAN   Dose:  10 mg   Quantity:  15 tablet        Take 1 tablet (10 mg) by mouth 4 times daily as needed   Refills:  0        triamcinolone 0.1 % ointment   Commonly known as:  KENALOG   Quantity:  15 g        Apply sparingly to affected area. Use up to three times a day to rash on right hand   Refills:  0                Prescriptions were sent or printed at these locations (1 Prescription)                   Other Prescriptions                Printed at Department/Unit printer (1 of 1)         penicillin V potassium (VEETID) 500 MG tablet                Orders Needing Specimen Collection     None      Pending Results     No orders found from 5/18/2018 to 5/21/2018.            Pending Culture Results     No orders found from 5/18/2018 to 5/21/2018.            Pending Results Instructions     If you had any lab results that were not finalized at the time of your Discharge, you can call the ED Lab Result RN at [phone]. You will be contacted by this team for any positive Lab results or changes in treatment. The nurses are available 7 days a week from 10A to  6:30P.  You can leave a message 24 hours per day and they will return your call.        Test Results From Your Hospital Stay               Clinical Quality Measure: Blood Pressure Screening     Your blood pressure was checked while you were in the emergency department today. The last reading we obtained was  BP: 161/83 . Please read the guidelines below about what these numbers mean and what you should do about them.  If your systolic blood pressure (the top number) is less than 120 and your diastolic blood pressure (the bottom number) is less than 80, then your blood pressure is normal. There is nothing more that you need to do about it.  If your systolic blood pressure (the top number) is 120-139 or your diastolic blood pressure (the bottom number) is 80-89, your blood pressure may be higher than it should be. You should have your blood pressure rechecked within a year by a primary care provider.  If your systolic blood pressure (the top number) is 140 or greater or your diastolic blood pressure (the bottom number) is 90 or greater, you may have high blood pressure. High blood pressure is treatable, but if left untreated over time it can put you at risk for heart attack, stroke, or kidney failure. You should have your blood pressure rechecked by a primary care provider within the next 4 weeks.  If your provider in the emergency department today gave you specific instructions to follow-up with your doctor or provider even sooner than that, you should follow that instruction and not wait for up to 4 weeks for your follow-up visit.        Thank you for choosing [facility]       Thank you for choosing [facility] for your care. Our goal is always to provide you with excellent care. Hearing back from our patients is one way we can continue to improve our services. Please take a few minutes to complete the written survey that you may receive in the mail after you visit with us. Thank you!        MyChart Information      "[employer] lets you send messages to your doctor, view your test results, renew your prescriptions, schedule appointments and more. To sign up, go to [email or web address] . Click on \"Log in\" on the left side of the screen, which will take you to the Welcome page. Then click on \"Sign up Now\" on the right side of the page.     You will be asked to enter the access code listed below, as well as some personal information. Please follow the directions to create your username and password.     Your access code is: [email or web address]  Expires: 2018  6:11 PM     Your access code will  in 90 days. If you need help or a new code, please call your [facility] clinic or [phone].        Care EveryWhere ID     This is your Care EveryWhere ID. This could be used by other organizations to access your [facility] medical records  [id number]        Equal Access to Services     CATERINA CHUN AH: Hadii wes correao Somaral, waaxda lushilpa, qaybta kaalmada adeannette, salvador shook . So [facility] [phone].    ATENCIÓN: Si habla español, tiene a duval disposición servicios gratuitos de asistencia lingüística. Llame al [phone].    We comply with applicable federal civil rights laws and [place] laws. We do not discriminate on the basis of race, color, national origin, age, disability, sex, sexual orientation, or gender identity.            After Visit Summary       This is your record. Keep this with you and show to your community pharmacist(s) and doctor(s) at your next visit.                  "

## 2018-05-20 NOTE — ED PROVIDER NOTES
History     Chief Complaint:  Dental Pain     FRANCOISE Knox is a 42 year old female who presents  for evaluation of right upper dental pain.  The pain is located in her right upper incisor and first premolar. She states the pain started yesterday and today it severely worsened.  She now states the pain is radiating to her face.  She states last night she felt hot, but denies taking her temperature.  She denies any fever, numbness/tingling, difficulty swallowing, tolerating her secretions, breathing, or any other concern.  She has been taking Tylenol and Orajel with relief.  She denies taking any ibuprofen.  She has not seen a dentist in over 6 months.  She does feel confident in her ability to get into her dental clinic tomorrow.  No other acute concerns.     Allergies:  No known drug allergies.     Medications:    Metformin    Past Medical History:    DM      Past Surgical History:    Appendectomy    Family History:    family history includes Breast Cancer in her mother; DIABETES in her mother and paternal grandmother; HEART DISEASE in her maternal aunt and paternal grandmother; Hypertension in her mother.    Social History:   reports that she has never smoked. She has never used smokeless tobacco. She reports that she drinks alcohol. She reports that she does not use illicit drugs.    PCP: No Ref-Primary, Physician     Review of Systems   HENT: Positive for dental problem.    All other systems reviewed and are negative.      Physical Exam     Patient Vitals for the past 24 hrs:   BP Temp Temp src Pulse Resp SpO2   05/20/18 1724 161/83 97.6  F (36.4  C) Temporal 100 20 99 %        Physical Exam  General: Resting comfortably.  Alert and oriented.   Head:  The scalp, face, and head appear normal   Eyes:  Conjunctivae and sclerae are normal    ENT:    The oropharynx is normal    Uvula is in the midline     Structures are symmetric    Tenderness palpation of right upper incisor and first premolar.    Diffuse  dental caries noted throughout.      No abscess amenable to drainage.    No sublingual swelling.     Neck:  No facial or neck swelling.      No lymphadenopathy  CV:  Regular rate and rhythm     Normal S1/S2    No pathological murmur detected   Resp:  Lungs are clear to auscultation    Non-labored    No rales or wheezing   MS:  Normal muscular tone   Skin:  No rash or acute skin lesions noted   Neuro: Speech is normal and fluent.     Emergency Department Course     Procedures:    Dental Block     INDICATIONS: Dental pain    ANESTHESIA: Middle superior alveolar nerve block using dental marcaine.     PATIENT STATUS: The patient tolerated the procedure well and there were no immediate complications.      Interventions:  Medications   ibuprofen (ADVIL/MOTRIN) tablet 600 mg (600 mg Oral Given 5/20/18 1814)   HYDROcodone-acetaminophen (NORCO) 5-325 MG per tablet 2 tablet (2 tablets Oral Given 5/20/18 1814)      Emergency Department Course:  Past medical records, nursing notes, and vitals reviewed.  I performed an exam of the patient and obtained history, as documented above.  Patient was given the above medications.  A dental block performed as above.  Patient be discharged home.  She is asked to follow-up with a dentist tomorrow.  She was discharged home with prescription for ibuprofen and penicillin.  She is asked to return immediately for trouble swallowing/breathing, tolerating her secretions, fever, or facial/neck swelling, or any other concern.  All questions were answered prior to discharge.  Patient understands and agrees to this plan.    Impression & Plan      Medical Decision Making:  Mikki Knox is a 42 year old female who presents for evaluation of jaw pain.  This appears to be secondary to a dental issue. There is no abscess detected around the tooth amenable to incision and drainage. The differential diagnosis includes: cracked tooth syndrome, pulpitis, sub-apical abscess, facial cellulitis, alveolitis  amongst others. There is no evidence of deep space infections, significant facial swelling, Lemierre's Syndrome or Michael's angina. There are no posterior pharyngeal space (RPA, PTA) infections detected. A dental block was performed as above with vast improvement in symptoms. The patient was given Ibuprofen and Norco prior to discharge. I think she is safe to be discharged home. She was discharged home with a prescription for Ibuprofen and Penicillin. The importance of dental follow up was stressed during the encounter.  She plans to follow-up in her dental clinic tomorrow.  Patient was also given additional dental clinic numbers to contact if needed she cannot get into a regular dental clinic.  She will return immediately for facial/neck swelling, trouble breathing, trouble tolerating her secretions, trismus, fever, or any other concern.  All questions were answered prior to discharge.  The patient understands and agrees to this plan.      Diagnosis:  Dental Pain    Plan:  Ibuprofen  Penicillin  F/U with Dentist in 1-2 days  Return to ED with any worsening symptoms     Diagnosis:    ICD-10-CM    1. Jaw pain R68.84         Discharge Medications:  Discharge Medication List as of 5/20/2018  6:39 PM      START taking these medications    Details   penicillin V potassium (VEETID) 500 MG tablet Take 1 tablet (500 mg) by mouth 4 times daily for 10 days, Disp-40 tablet, R-0, Local Print            Scribe Disclosure:  I, Donovan Medina, am serving as a scribe at 9:34 PM on 5/20/2018 to document services personally performed by Bailey Bell PA*, based on my observations and the provider's statements to me.        Bailey Bell PA-C  05/20/18 7607

## 2018-05-20 NOTE — ED AVS SNAPSHOT
[facility] Emergency Department    [address]    [facility] [address]    Phone:  [phone]    Fax:  [phone]                                       Mikki Knox   MRN: [id number]    Department:  [facility] Emergency Department   Date of Visit:  5/20/2018           After Visit Summary Signature Page     I have received my discharge instructions, and my questions have been answered. I have discussed any challenges I see with this plan with the nurse or doctor.    ..........................................................................................................................................  Patient/Patient Representative Signature      ..........................................................................................................................................  Patient Representative Print Name and Relationship to Patient    ..................................................               ................................................  Date                                            Time    ..........................................................................................................................................  Reviewed by Signature/Title    ...................................................              ..............................................  Date                                                            Time

## 2019-12-02 ENCOUNTER — APPOINTMENT (OUTPATIENT)
Dept: GENERAL RADIOLOGY | Facility: CLINIC | Age: 44
DRG: 494 | End: 2019-12-02
Attending: ORTHOPAEDIC SURGERY
Payer: OTHER MISCELLANEOUS

## 2019-12-02 ENCOUNTER — APPOINTMENT (OUTPATIENT)
Dept: GENERAL RADIOLOGY | Facility: CLINIC | Age: 44
DRG: 494 | End: 2019-12-02
Attending: INTERNAL MEDICINE
Payer: OTHER MISCELLANEOUS

## 2019-12-02 ENCOUNTER — ANESTHESIA (OUTPATIENT)
Dept: SURGERY | Facility: CLINIC | Age: 44
DRG: 494 | End: 2019-12-02
Payer: OTHER MISCELLANEOUS

## 2019-12-02 ENCOUNTER — ANESTHESIA EVENT (OUTPATIENT)
Dept: SURGERY | Facility: CLINIC | Age: 44
DRG: 494 | End: 2019-12-02
Payer: OTHER MISCELLANEOUS

## 2019-12-02 ENCOUNTER — APPOINTMENT (OUTPATIENT)
Dept: CT IMAGING | Facility: CLINIC | Age: 44
DRG: 494 | End: 2019-12-02
Attending: INTERNAL MEDICINE
Payer: OTHER MISCELLANEOUS

## 2019-12-02 ENCOUNTER — HOSPITAL ENCOUNTER (INPATIENT)
Facility: CLINIC | Age: 44
LOS: 2 days | Discharge: HOME | DRG: 494 | End: 2019-12-04
Attending: INTERNAL MEDICINE | Admitting: ORTHOPAEDIC SURGERY
Payer: OTHER MISCELLANEOUS

## 2019-12-02 DIAGNOSIS — E11.9 TYPE 2 DIABETES MELLITUS WITHOUT COMPLICATION, WITH LONG-TERM CURRENT USE OF INSULIN (H): ICD-10-CM

## 2019-12-02 DIAGNOSIS — Z79.4 TYPE 2 DIABETES MELLITUS WITHOUT COMPLICATION, WITH LONG-TERM CURRENT USE OF INSULIN (H): ICD-10-CM

## 2019-12-02 DIAGNOSIS — S93.04XA DISLOCATION OF RIGHT ANKLE JOINT, INITIAL ENCOUNTER: ICD-10-CM

## 2019-12-02 DIAGNOSIS — S82.841A ANKLE FRACTURE, BIMALLEOLAR, CLOSED, RIGHT, INITIAL ENCOUNTER: Primary | ICD-10-CM

## 2019-12-02 DIAGNOSIS — S82.851A CLOSED TRIMALLEOLAR FRACTURE OF RIGHT ANKLE, INITIAL ENCOUNTER: ICD-10-CM

## 2019-12-02 PROBLEM — S82.891A CLOSED RIGHT ANKLE FRACTURE: Status: ACTIVE | Noted: 2019-12-02

## 2019-12-02 LAB
ANION GAP SERPL CALCULATED.3IONS-SCNC: 8 MMOL/L (ref 3–14)
BASOPHILS # BLD AUTO: 0 10E9/L (ref 0–0.2)
BASOPHILS NFR BLD AUTO: 0.3 %
BUN SERPL-MCNC: 9 MG/DL (ref 7–30)
CALCIUM SERPL-MCNC: 8.6 MG/DL (ref 8.5–10.1)
CHLORIDE SERPL-SCNC: 105 MMOL/L (ref 94–109)
CO2 SERPL-SCNC: 23 MMOL/L (ref 20–32)
CREAT SERPL-MCNC: 0.51 MG/DL (ref 0.52–1.04)
DIFFERENTIAL METHOD BLD: ABNORMAL
EOSINOPHIL # BLD AUTO: 0 10E9/L (ref 0–0.7)
EOSINOPHIL NFR BLD AUTO: 0.1 %
ERYTHROCYTE [DISTWIDTH] IN BLOOD BY AUTOMATED COUNT: 12.4 % (ref 10–15)
GFR SERPL CREATININE-BSD FRML MDRD: >90 ML/MIN/{1.73_M2}
GLUCOSE BLDC GLUCOMTR-MCNC: 209 MG/DL (ref 70–99)
GLUCOSE BLDC GLUCOMTR-MCNC: 268 MG/DL (ref 70–99)
GLUCOSE BLDC GLUCOMTR-MCNC: 296 MG/DL (ref 70–99)
GLUCOSE BLDC GLUCOMTR-MCNC: 301 MG/DL (ref 70–99)
GLUCOSE BLDC GLUCOMTR-MCNC: 304 MG/DL (ref 70–99)
GLUCOSE SERPL-MCNC: 331 MG/DL (ref 70–99)
HCG SERPL QL: NEGATIVE
HCT VFR BLD AUTO: 43.7 % (ref 35–47)
HGB BLD-MCNC: 14.8 G/DL (ref 11.7–15.7)
IMM GRANULOCYTES # BLD: 0 10E9/L (ref 0–0.4)
IMM GRANULOCYTES NFR BLD: 0.3 %
LYMPHOCYTES # BLD AUTO: 1 10E9/L (ref 0.8–5.3)
LYMPHOCYTES NFR BLD AUTO: 8.7 %
MCH RBC QN AUTO: 29.7 PG (ref 26.5–33)
MCHC RBC AUTO-ENTMCNC: 33.9 G/DL (ref 31.5–36.5)
MCV RBC AUTO: 88 FL (ref 78–100)
MONOCYTES # BLD AUTO: 0.5 10E9/L (ref 0–1.3)
MONOCYTES NFR BLD AUTO: 4.1 %
NEUTROPHILS # BLD AUTO: 10.1 10E9/L (ref 1.6–8.3)
NEUTROPHILS NFR BLD AUTO: 86.5 %
NRBC # BLD AUTO: 0 10*3/UL
NRBC BLD AUTO-RTO: 0 /100
PLATELET # BLD AUTO: 291 10E9/L (ref 150–450)
POTASSIUM SERPL-SCNC: 3.9 MMOL/L (ref 3.4–5.3)
RBC # BLD AUTO: 4.98 10E12/L (ref 3.8–5.2)
SODIUM SERPL-SCNC: 136 MMOL/L (ref 133–144)
WBC # BLD AUTO: 11.7 10E9/L (ref 4–11)

## 2019-12-02 PROCEDURE — 25000128 H RX IP 250 OP 636: Performed by: ORTHOPAEDIC SURGERY

## 2019-12-02 PROCEDURE — 25000131 ZZH RX MED GY IP 250 OP 636 PS 637: Performed by: ANESTHESIOLOGY

## 2019-12-02 PROCEDURE — 25000132 ZZH RX MED GY IP 250 OP 250 PS 637: Performed by: ORTHOPAEDIC SURGERY

## 2019-12-02 PROCEDURE — 99152 MOD SED SAME PHYS/QHP 5/>YRS: CPT

## 2019-12-02 PROCEDURE — 36415 COLL VENOUS BLD VENIPUNCTURE: CPT | Performed by: PHYSICIAN ASSISTANT

## 2019-12-02 PROCEDURE — 73610 X-RAY EXAM OF ANKLE: CPT | Mod: RT

## 2019-12-02 PROCEDURE — 0QSJXZZ REPOSITION RIGHT FIBULA, EXTERNAL APPROACH: ICD-10-PCS | Performed by: INTERNAL MEDICINE

## 2019-12-02 PROCEDURE — C1713 ANCHOR/SCREW BN/BN,TIS/BN: HCPCS | Performed by: ORTHOPAEDIC SURGERY

## 2019-12-02 PROCEDURE — 25800030 ZZH RX IP 258 OP 636: Performed by: ORTHOPAEDIC SURGERY

## 2019-12-02 PROCEDURE — 93010 ELECTROCARDIOGRAM REPORT: CPT | Performed by: INTERNAL MEDICINE

## 2019-12-02 PROCEDURE — 36000063 ZZH SURGERY LEVEL 4 EA 15 ADDTL MIN: Performed by: ORTHOPAEDIC SURGERY

## 2019-12-02 PROCEDURE — 0QSJ04Z REPOSITION RIGHT FIBULA WITH INTERNAL FIXATION DEVICE, OPEN APPROACH: ICD-10-PCS | Performed by: ORTHOPAEDIC SURGERY

## 2019-12-02 PROCEDURE — 37000008 ZZH ANESTHESIA TECHNICAL FEE, 1ST 30 MIN: Performed by: ORTHOPAEDIC SURGERY

## 2019-12-02 PROCEDURE — 96374 THER/PROPH/DIAG INJ IV PUSH: CPT

## 2019-12-02 PROCEDURE — 12000000 ZZH R&B MED SURG/OB

## 2019-12-02 PROCEDURE — 36415 COLL VENOUS BLD VENIPUNCTURE: CPT | Performed by: INTERNAL MEDICINE

## 2019-12-02 PROCEDURE — 40000275 ZZH STATISTIC RCP TIME EA 10 MIN

## 2019-12-02 PROCEDURE — 36000065 ZZH SURGERY LEVEL 4 W FLUORO 1ST 30 MIN: Performed by: ORTHOPAEDIC SURGERY

## 2019-12-02 PROCEDURE — 84703 CHORIONIC GONADOTROPIN ASSAY: CPT | Performed by: INTERNAL MEDICINE

## 2019-12-02 PROCEDURE — 99285 EMERGENCY DEPT VISIT HI MDM: CPT | Mod: 25

## 2019-12-02 PROCEDURE — 0QSG04Z REPOSITION RIGHT TIBIA WITH INTERNAL FIXATION DEVICE, OPEN APPROACH: ICD-10-PCS | Performed by: ORTHOPAEDIC SURGERY

## 2019-12-02 PROCEDURE — 80048 BASIC METABOLIC PNL TOTAL CA: CPT | Performed by: INTERNAL MEDICINE

## 2019-12-02 PROCEDURE — 96376 TX/PRO/DX INJ SAME DRUG ADON: CPT

## 2019-12-02 PROCEDURE — 25800030 ZZH RX IP 258 OP 636: Performed by: ANESTHESIOLOGY

## 2019-12-02 PROCEDURE — 25000125 ZZHC RX 250: Performed by: ORTHOPAEDIC SURGERY

## 2019-12-02 PROCEDURE — 25000128 H RX IP 250 OP 636: Performed by: INTERNAL MEDICINE

## 2019-12-02 PROCEDURE — 25000128 H RX IP 250 OP 636: Performed by: ANESTHESIOLOGY

## 2019-12-02 PROCEDURE — 25000128 H RX IP 250 OP 636: Performed by: NURSE ANESTHETIST, CERTIFIED REGISTERED

## 2019-12-02 PROCEDURE — 25000125 ZZHC RX 250: Performed by: NURSE ANESTHETIST, CERTIFIED REGISTERED

## 2019-12-02 PROCEDURE — 40000305 ZZH STATISTIC PRE PROC ASSESS I: Performed by: ORTHOPAEDIC SURGERY

## 2019-12-02 PROCEDURE — 27210794 ZZH OR GENERAL SUPPLY STERILE: Performed by: ORTHOPAEDIC SURGERY

## 2019-12-02 PROCEDURE — 27818 TREATMENT OF ANKLE FRACTURE: CPT | Mod: RT

## 2019-12-02 PROCEDURE — 37000009 ZZH ANESTHESIA TECHNICAL FEE, EACH ADDTL 15 MIN: Performed by: ORTHOPAEDIC SURGERY

## 2019-12-02 PROCEDURE — 73700 CT LOWER EXTREMITY W/O DYE: CPT | Mod: RT

## 2019-12-02 PROCEDURE — 0SSF04Z REPOSITION RIGHT ANKLE JOINT WITH INTERNAL FIXATION DEVICE, OPEN APPROACH: ICD-10-PCS | Performed by: ORTHOPAEDIC SURGERY

## 2019-12-02 PROCEDURE — 00000146 ZZHCL STATISTIC GLUCOSE BY METER IP

## 2019-12-02 PROCEDURE — 40000277 XR SURGERY CARM FLUORO LESS THAN 5 MIN W STILLS: Mod: TC

## 2019-12-02 PROCEDURE — 85025 COMPLETE CBC W/AUTO DIFF WBC: CPT | Performed by: PHYSICIAN ASSISTANT

## 2019-12-02 PROCEDURE — 25000125 ZZHC RX 250: Performed by: ANESTHESIOLOGY

## 2019-12-02 PROCEDURE — 25800030 ZZH RX IP 258 OP 636: Performed by: NURSE ANESTHETIST, CERTIFIED REGISTERED

## 2019-12-02 PROCEDURE — 71000012 ZZH RECOVERY PHASE 1 LEVEL 1 FIRST HR: Performed by: ORTHOPAEDIC SURGERY

## 2019-12-02 PROCEDURE — 40000965 ZZH STATISTIC END TITIAL CO2 MONITORING

## 2019-12-02 PROCEDURE — 25000128 H RX IP 250 OP 636: Performed by: PHYSICIAN ASSISTANT

## 2019-12-02 DEVICE — IMP SCR SYN CORTEX 3.5X16MM SELF TAP SS 204.816: Type: IMPLANTABLE DEVICE | Site: ANKLE | Status: FUNCTIONAL

## 2019-12-02 DEVICE — IMP PLATE SYN 1/3 TUBULAR 117MM 10H SS 241.401: Type: IMPLANTABLE DEVICE | Site: ANKLE | Status: FUNCTIONAL

## 2019-12-02 DEVICE — IMPLANTABLE DEVICE: Type: IMPLANTABLE DEVICE | Site: ANKLE | Status: FUNCTIONAL

## 2019-12-02 DEVICE — IMP SCR SYN CORTEX 3.5X14MM SELF TAP SS 204.814: Type: IMPLANTABLE DEVICE | Site: ANKLE | Status: FUNCTIONAL

## 2019-12-02 RX ORDER — VANCOMYCIN HYDROCHLORIDE 1 G/20ML
INJECTION, POWDER, LYOPHILIZED, FOR SOLUTION INTRAVENOUS PRN
Status: DISCONTINUED | OUTPATIENT
Start: 2019-12-02 | End: 2019-12-02 | Stop reason: HOSPADM

## 2019-12-02 RX ORDER — ONDANSETRON 2 MG/ML
INJECTION INTRAMUSCULAR; INTRAVENOUS PRN
Status: DISCONTINUED | OUTPATIENT
Start: 2019-12-02 | End: 2019-12-02

## 2019-12-02 RX ORDER — KETAMINE HYDROCHLORIDE 10 MG/ML
INJECTION INTRAMUSCULAR; INTRAVENOUS PRN
Status: DISCONTINUED | OUTPATIENT
Start: 2019-12-02 | End: 2019-12-02

## 2019-12-02 RX ORDER — LIDOCAINE 40 MG/G
CREAM TOPICAL
Status: DISCONTINUED | OUTPATIENT
Start: 2019-12-02 | End: 2019-12-04 | Stop reason: HOSPADM

## 2019-12-02 RX ORDER — PROCHLORPERAZINE MALEATE 5 MG
10 TABLET ORAL EVERY 6 HOURS PRN
Status: DISCONTINUED | OUTPATIENT
Start: 2019-12-02 | End: 2019-12-04 | Stop reason: HOSPADM

## 2019-12-02 RX ORDER — ONDANSETRON 2 MG/ML
4 INJECTION INTRAMUSCULAR; INTRAVENOUS EVERY 30 MIN PRN
Status: DISCONTINUED | OUTPATIENT
Start: 2019-12-02 | End: 2019-12-02 | Stop reason: HOSPADM

## 2019-12-02 RX ORDER — HYDROMORPHONE HYDROCHLORIDE 1 MG/ML
0.5 INJECTION, SOLUTION INTRAMUSCULAR; INTRAVENOUS; SUBCUTANEOUS
Status: DISCONTINUED | OUTPATIENT
Start: 2019-12-02 | End: 2019-12-02

## 2019-12-02 RX ORDER — CEFAZOLIN SODIUM 2 G/100ML
2 INJECTION, SOLUTION INTRAVENOUS
Status: COMPLETED | OUTPATIENT
Start: 2019-12-02 | End: 2019-12-02

## 2019-12-02 RX ORDER — ONDANSETRON 4 MG/1
4 TABLET, ORALLY DISINTEGRATING ORAL EVERY 6 HOURS PRN
Status: DISCONTINUED | OUTPATIENT
Start: 2019-12-02 | End: 2019-12-04 | Stop reason: HOSPADM

## 2019-12-02 RX ORDER — LIDOCAINE 40 MG/G
CREAM TOPICAL
Status: DISCONTINUED | OUTPATIENT
Start: 2019-12-02 | End: 2019-12-02 | Stop reason: HOSPADM

## 2019-12-02 RX ORDER — TRIAMCINOLONE ACETONIDE 1 MG/G
OINTMENT TOPICAL 3 TIMES DAILY PRN
Status: DISCONTINUED | OUTPATIENT
Start: 2019-12-02 | End: 2019-12-04 | Stop reason: HOSPADM

## 2019-12-02 RX ORDER — NALOXONE HYDROCHLORIDE 0.4 MG/ML
.1-.4 INJECTION, SOLUTION INTRAMUSCULAR; INTRAVENOUS; SUBCUTANEOUS
Status: DISCONTINUED | OUTPATIENT
Start: 2019-12-02 | End: 2019-12-04 | Stop reason: HOSPADM

## 2019-12-02 RX ORDER — AMOXICILLIN 250 MG
2 CAPSULE ORAL 2 TIMES DAILY
Status: DISCONTINUED | OUTPATIENT
Start: 2019-12-02 | End: 2019-12-04 | Stop reason: HOSPADM

## 2019-12-02 RX ORDER — METOCLOPRAMIDE 5 MG/1
10 TABLET ORAL EVERY 6 HOURS PRN
Status: DISCONTINUED | OUTPATIENT
Start: 2019-12-02 | End: 2019-12-04 | Stop reason: HOSPADM

## 2019-12-02 RX ORDER — CALCIUM CARBONATE 500 MG/1
1000 TABLET, CHEWABLE ORAL 4 TIMES DAILY PRN
Status: DISCONTINUED | OUTPATIENT
Start: 2019-12-02 | End: 2019-12-04 | Stop reason: HOSPADM

## 2019-12-02 RX ORDER — ONDANSETRON 2 MG/ML
4 INJECTION INTRAMUSCULAR; INTRAVENOUS EVERY 6 HOURS PRN
Status: DISCONTINUED | OUTPATIENT
Start: 2019-12-02 | End: 2019-12-02

## 2019-12-02 RX ORDER — PROCHLORPERAZINE 25 MG
25 SUPPOSITORY, RECTAL RECTAL EVERY 12 HOURS PRN
Status: DISCONTINUED | OUTPATIENT
Start: 2019-12-02 | End: 2019-12-04 | Stop reason: HOSPADM

## 2019-12-02 RX ORDER — METOCLOPRAMIDE HYDROCHLORIDE 5 MG/ML
10 INJECTION INTRAMUSCULAR; INTRAVENOUS EVERY 6 HOURS PRN
Status: DISCONTINUED | OUTPATIENT
Start: 2019-12-02 | End: 2019-12-04 | Stop reason: HOSPADM

## 2019-12-02 RX ORDER — ONDANSETRON 2 MG/ML
4 INJECTION INTRAMUSCULAR; INTRAVENOUS ONCE
Status: DISCONTINUED | OUTPATIENT
Start: 2019-12-02 | End: 2019-12-02

## 2019-12-02 RX ORDER — OXYCODONE HYDROCHLORIDE 5 MG/1
5-10 TABLET ORAL
Status: DISCONTINUED | OUTPATIENT
Start: 2019-12-02 | End: 2019-12-04 | Stop reason: HOSPADM

## 2019-12-02 RX ORDER — HYDROMORPHONE HYDROCHLORIDE 1 MG/ML
.3-.5 INJECTION, SOLUTION INTRAMUSCULAR; INTRAVENOUS; SUBCUTANEOUS
Status: DISCONTINUED | OUTPATIENT
Start: 2019-12-02 | End: 2019-12-02

## 2019-12-02 RX ORDER — ONDANSETRON 4 MG/1
4 TABLET, ORALLY DISINTEGRATING ORAL EVERY 30 MIN PRN
Status: DISCONTINUED | OUTPATIENT
Start: 2019-12-02 | End: 2019-12-02 | Stop reason: HOSPADM

## 2019-12-02 RX ORDER — ACETAMINOPHEN 325 MG/1
650 TABLET ORAL EVERY 4 HOURS PRN
Status: DISCONTINUED | OUTPATIENT
Start: 2019-12-02 | End: 2019-12-04 | Stop reason: HOSPADM

## 2019-12-02 RX ORDER — NEOSTIGMINE METHYLSULFATE 1 MG/ML
VIAL (ML) INJECTION PRN
Status: DISCONTINUED | OUTPATIENT
Start: 2019-12-02 | End: 2019-12-02

## 2019-12-02 RX ORDER — DIPHENHYDRAMINE HYDROCHLORIDE 50 MG/ML
25 INJECTION INTRAMUSCULAR; INTRAVENOUS EVERY 6 HOURS PRN
Status: DISCONTINUED | OUTPATIENT
Start: 2019-12-02 | End: 2019-12-04 | Stop reason: HOSPADM

## 2019-12-02 RX ORDER — SODIUM CHLORIDE 9 MG/ML
INJECTION, SOLUTION INTRAVENOUS CONTINUOUS
Status: DISCONTINUED | OUTPATIENT
Start: 2019-12-02 | End: 2019-12-03

## 2019-12-02 RX ORDER — NALOXONE HYDROCHLORIDE 0.4 MG/ML
.1-.4 INJECTION, SOLUTION INTRAMUSCULAR; INTRAVENOUS; SUBCUTANEOUS
Status: DISCONTINUED | OUTPATIENT
Start: 2019-12-02 | End: 2019-12-02

## 2019-12-02 RX ORDER — AMOXICILLIN 250 MG
2 CAPSULE ORAL 2 TIMES DAILY
Status: DISCONTINUED | OUTPATIENT
Start: 2019-12-02 | End: 2019-12-02

## 2019-12-02 RX ORDER — HYDROMORPHONE HYDROCHLORIDE 1 MG/ML
.3-.5 INJECTION, SOLUTION INTRAMUSCULAR; INTRAVENOUS; SUBCUTANEOUS
Status: DISCONTINUED | OUTPATIENT
Start: 2019-12-02 | End: 2019-12-04 | Stop reason: HOSPADM

## 2019-12-02 RX ORDER — ONDANSETRON 2 MG/ML
4 INJECTION INTRAMUSCULAR; INTRAVENOUS EVERY 6 HOURS PRN
Status: DISCONTINUED | OUTPATIENT
Start: 2019-12-02 | End: 2019-12-04 | Stop reason: HOSPADM

## 2019-12-02 RX ORDER — FENTANYL CITRATE 50 UG/ML
25-50 INJECTION, SOLUTION INTRAMUSCULAR; INTRAVENOUS
Status: DISCONTINUED | OUTPATIENT
Start: 2019-12-02 | End: 2019-12-02 | Stop reason: HOSPADM

## 2019-12-02 RX ORDER — TRIAMCINOLONE ACETONIDE 1 MG/G
OINTMENT TOPICAL 3 TIMES DAILY PRN
Status: ON HOLD | COMMUNITY
End: 2019-12-03

## 2019-12-02 RX ORDER — LIDOCAINE HYDROCHLORIDE 10 MG/ML
INJECTION, SOLUTION INFILTRATION; PERINEURAL PRN
Status: DISCONTINUED | OUTPATIENT
Start: 2019-12-02 | End: 2019-12-02

## 2019-12-02 RX ORDER — SODIUM CHLORIDE, SODIUM LACTATE, POTASSIUM CHLORIDE, CALCIUM CHLORIDE 600; 310; 30; 20 MG/100ML; MG/100ML; MG/100ML; MG/100ML
INJECTION, SOLUTION INTRAVENOUS CONTINUOUS
Status: DISCONTINUED | OUTPATIENT
Start: 2019-12-02 | End: 2019-12-02 | Stop reason: HOSPADM

## 2019-12-02 RX ORDER — MAGNESIUM HYDROXIDE 1200 MG/15ML
LIQUID ORAL PRN
Status: DISCONTINUED | OUTPATIENT
Start: 2019-12-02 | End: 2019-12-02 | Stop reason: HOSPADM

## 2019-12-02 RX ORDER — PROPOFOL 10 MG/ML
INJECTION, EMULSION INTRAVENOUS PRN
Status: DISCONTINUED | OUTPATIENT
Start: 2019-12-02 | End: 2019-12-02

## 2019-12-02 RX ORDER — POLYETHYLENE GLYCOL 3350 17 G/17G
17 POWDER, FOR SOLUTION ORAL DAILY PRN
Status: DISCONTINUED | OUTPATIENT
Start: 2019-12-02 | End: 2019-12-04 | Stop reason: HOSPADM

## 2019-12-02 RX ORDER — DIPHENHYDRAMINE HCL 25 MG
25 CAPSULE ORAL EVERY 6 HOURS PRN
Status: DISCONTINUED | OUTPATIENT
Start: 2019-12-02 | End: 2019-12-04 | Stop reason: HOSPADM

## 2019-12-02 RX ORDER — ACETAMINOPHEN 325 MG/1
975 TABLET ORAL EVERY 8 HOURS
Status: DISCONTINUED | OUTPATIENT
Start: 2019-12-02 | End: 2019-12-04 | Stop reason: HOSPADM

## 2019-12-02 RX ORDER — AMOXICILLIN 250 MG
1 CAPSULE ORAL 2 TIMES DAILY
Status: DISCONTINUED | OUTPATIENT
Start: 2019-12-02 | End: 2019-12-04 | Stop reason: HOSPADM

## 2019-12-02 RX ORDER — FENTANYL CITRATE 50 UG/ML
INJECTION, SOLUTION INTRAMUSCULAR; INTRAVENOUS PRN
Status: DISCONTINUED | OUTPATIENT
Start: 2019-12-02 | End: 2019-12-02

## 2019-12-02 RX ORDER — AMOXICILLIN 250 MG
1 CAPSULE ORAL 2 TIMES DAILY
Status: DISCONTINUED | OUTPATIENT
Start: 2019-12-02 | End: 2019-12-02

## 2019-12-02 RX ORDER — HYDROMORPHONE HYDROCHLORIDE 1 MG/ML
.3-.5 INJECTION, SOLUTION INTRAMUSCULAR; INTRAVENOUS; SUBCUTANEOUS EVERY 5 MIN PRN
Status: DISCONTINUED | OUTPATIENT
Start: 2019-12-02 | End: 2019-12-02 | Stop reason: HOSPADM

## 2019-12-02 RX ORDER — CEFAZOLIN SODIUM 1 G/3ML
1 INJECTION, POWDER, FOR SOLUTION INTRAMUSCULAR; INTRAVENOUS SEE ADMIN INSTRUCTIONS
Status: DISCONTINUED | OUTPATIENT
Start: 2019-12-02 | End: 2019-12-02 | Stop reason: HOSPADM

## 2019-12-02 RX ORDER — ONDANSETRON 4 MG/1
4 TABLET, ORALLY DISINTEGRATING ORAL EVERY 6 HOURS PRN
Status: DISCONTINUED | OUTPATIENT
Start: 2019-12-02 | End: 2019-12-02

## 2019-12-02 RX ORDER — GLYCOPYRROLATE 0.2 MG/ML
INJECTION, SOLUTION INTRAMUSCULAR; INTRAVENOUS PRN
Status: DISCONTINUED | OUTPATIENT
Start: 2019-12-02 | End: 2019-12-02

## 2019-12-02 RX ORDER — CEFAZOLIN SODIUM 1 G/50ML
1 INJECTION, SOLUTION INTRAVENOUS EVERY 8 HOURS
Status: COMPLETED | OUTPATIENT
Start: 2019-12-02 | End: 2019-12-03

## 2019-12-02 RX ORDER — BUPIVACAINE HYDROCHLORIDE 5 MG/ML
INJECTION, SOLUTION EPIDURAL; INTRACAUDAL PRN
Status: DISCONTINUED | OUTPATIENT
Start: 2019-12-02 | End: 2019-12-02 | Stop reason: HOSPADM

## 2019-12-02 RX ORDER — SCOLOPAMINE TRANSDERMAL SYSTEM 1 MG/1
1 PATCH, EXTENDED RELEASE TRANSDERMAL
Status: DISCONTINUED | OUTPATIENT
Start: 2019-12-02 | End: 2019-12-02

## 2019-12-02 RX ORDER — NICOTINE POLACRILEX 4 MG
15-30 LOZENGE BUCCAL
Status: DISCONTINUED | OUTPATIENT
Start: 2019-12-02 | End: 2019-12-04 | Stop reason: HOSPADM

## 2019-12-02 RX ORDER — EPHEDRINE SULFATE 50 MG/ML
INJECTION, SOLUTION INTRAMUSCULAR; INTRAVENOUS; SUBCUTANEOUS PRN
Status: DISCONTINUED | OUTPATIENT
Start: 2019-12-02 | End: 2019-12-02

## 2019-12-02 RX ORDER — PROPOFOL 10 MG/ML
200 INJECTION, EMULSION INTRAVENOUS ONCE
Status: COMPLETED | OUTPATIENT
Start: 2019-12-02 | End: 2019-12-02

## 2019-12-02 RX ORDER — OXYCODONE HYDROCHLORIDE 5 MG/1
5-10 TABLET ORAL
Status: DISCONTINUED | OUTPATIENT
Start: 2019-12-02 | End: 2019-12-02

## 2019-12-02 RX ORDER — IBUPROFEN 200 MG
200 TABLET ORAL EVERY 6 HOURS PRN
Status: ON HOLD | COMMUNITY
End: 2019-12-08

## 2019-12-02 RX ORDER — ACETAMINOPHEN 325 MG/1
650 TABLET ORAL EVERY 4 HOURS PRN
Status: DISCONTINUED | OUTPATIENT
Start: 2019-12-05 | End: 2019-12-02

## 2019-12-02 RX ORDER — DEXTROSE MONOHYDRATE 25 G/50ML
25-50 INJECTION, SOLUTION INTRAVENOUS
Status: DISCONTINUED | OUTPATIENT
Start: 2019-12-02 | End: 2019-12-04 | Stop reason: HOSPADM

## 2019-12-02 RX ORDER — LIDOCAINE 40 MG/G
CREAM TOPICAL
Status: DISCONTINUED | OUTPATIENT
Start: 2019-12-02 | End: 2019-12-03

## 2019-12-02 RX ADMIN — GLYCOPYRROLATE 0.3 MG: 0.2 INJECTION, SOLUTION INTRAMUSCULAR; INTRAVENOUS at 15:48

## 2019-12-02 RX ADMIN — ONDANSETRON HYDROCHLORIDE 4 MG: 2 INJECTION, SOLUTION INTRAMUSCULAR; INTRAVENOUS at 18:11

## 2019-12-02 RX ADMIN — ROCURONIUM BROMIDE 40 MG: 10 INJECTION INTRAVENOUS at 14:17

## 2019-12-02 RX ADMIN — SODIUM CHLORIDE: 9 INJECTION, SOLUTION INTRAVENOUS at 18:21

## 2019-12-02 RX ADMIN — ASPIRIN 325 MG: 325 TABLET, DELAYED RELEASE ORAL at 21:37

## 2019-12-02 RX ADMIN — DEXMEDETOMIDINE HYDROCHLORIDE 0.5 MCG/KG/HR: 100 INJECTION, SOLUTION INTRAVENOUS at 14:27

## 2019-12-02 RX ADMIN — LIDOCAINE HYDROCHLORIDE 30 MG: 10 INJECTION, SOLUTION INFILTRATION; PERINEURAL at 14:17

## 2019-12-02 RX ADMIN — Medication 2 MG: at 15:48

## 2019-12-02 RX ADMIN — CEFAZOLIN SODIUM 1 G: 1 INJECTION, SOLUTION INTRAVENOUS at 21:38

## 2019-12-02 RX ADMIN — FENTANYL CITRATE 50 MCG: 50 INJECTION, SOLUTION INTRAMUSCULAR; INTRAVENOUS at 17:12

## 2019-12-02 RX ADMIN — PROPOFOL 150 MG: 10 INJECTION, EMULSION INTRAVENOUS at 14:17

## 2019-12-02 RX ADMIN — SCOPOLAMINE 1 PATCH: 1 PATCH TRANSDERMAL at 13:44

## 2019-12-02 RX ADMIN — ACETAMINOPHEN 975 MG: 325 TABLET, FILM COATED ORAL at 18:57

## 2019-12-02 RX ADMIN — PROPOFOL 80 MG: 10 INJECTION, EMULSION INTRAVENOUS at 11:39

## 2019-12-02 RX ADMIN — INSULIN ASPART 5 UNITS: 100 INJECTION, SOLUTION INTRAVENOUS; SUBCUTANEOUS at 16:24

## 2019-12-02 RX ADMIN — SENNOSIDES AND DOCUSATE SODIUM 1 TABLET: 8.6; 5 TABLET ORAL at 21:37

## 2019-12-02 RX ADMIN — HYDROMORPHONE HYDROCHLORIDE 0.5 MG: 1 INJECTION, SOLUTION INTRAMUSCULAR; INTRAVENOUS; SUBCUTANEOUS at 10:34

## 2019-12-02 RX ADMIN — HYDROMORPHONE HYDROCHLORIDE 0.5 MG: 1 INJECTION, SOLUTION INTRAMUSCULAR; INTRAVENOUS; SUBCUTANEOUS at 22:02

## 2019-12-02 RX ADMIN — SODIUM CHLORIDE, POTASSIUM CHLORIDE, SODIUM LACTATE AND CALCIUM CHLORIDE: 600; 310; 30; 20 INJECTION, SOLUTION INTRAVENOUS at 14:03

## 2019-12-02 RX ADMIN — FENTANYL CITRATE 50 MCG: 50 INJECTION, SOLUTION INTRAMUSCULAR; INTRAVENOUS at 16:36

## 2019-12-02 RX ADMIN — FENTANYL CITRATE 100 MCG: 50 INJECTION, SOLUTION INTRAMUSCULAR; INTRAVENOUS at 14:17

## 2019-12-02 RX ADMIN — HYDROMORPHONE HYDROCHLORIDE 0.5 MG: 1 INJECTION, SOLUTION INTRAMUSCULAR; INTRAVENOUS; SUBCUTANEOUS at 18:58

## 2019-12-02 RX ADMIN — MIDAZOLAM 2 MG: 1 INJECTION INTRAMUSCULAR; INTRAVENOUS at 14:08

## 2019-12-02 RX ADMIN — DEXMEDETOMIDINE HYDROCHLORIDE 19 MCG: 100 INJECTION, SOLUTION INTRAVENOUS at 14:19

## 2019-12-02 RX ADMIN — HYDROMORPHONE HYDROCHLORIDE 0.5 MG: 1 INJECTION, SOLUTION INTRAMUSCULAR; INTRAVENOUS; SUBCUTANEOUS at 11:45

## 2019-12-02 RX ADMIN — ONDANSETRON HYDROCHLORIDE 4 MG: 2 INJECTION, SOLUTION INTRAVENOUS at 15:22

## 2019-12-02 RX ADMIN — CEFAZOLIN SODIUM 2 G: 2 INJECTION, SOLUTION INTRAVENOUS at 14:19

## 2019-12-02 RX ADMIN — Medication 5 MG: at 16:01

## 2019-12-02 RX ADMIN — Medication 50 MG: at 14:28

## 2019-12-02 ASSESSMENT — ENCOUNTER SYMPTOMS
JOINT SWELLING: 1
MYALGIAS: 1
WOUND: 0
DYSRHYTHMIAS: 0
BACK PAIN: 0
ABDOMINAL PAIN: 0
VOMITING: 0
NECK PAIN: 0
NAUSEA: 0
WEAKNESS: 0
HEADACHES: 0
NUMBNESS: 0

## 2019-12-02 ASSESSMENT — ACTIVITIES OF DAILY LIVING (ADL)
NUMBER_OF_TIMES_PATIENT_HAS_FALLEN_WITHIN_LAST_SIX_MONTHS: 1
RETIRED_COMMUNICATION: 0-->UNDERSTANDS/COMMUNICATES WITHOUT DIFFICULTY
RETIRED_EATING: 0-->INDEPENDENT
FALL_HISTORY_WITHIN_LAST_SIX_MONTHS: YES
TOILETING: 0-->INDEPENDENT
ADLS_ACUITY_SCORE: 10
DRESS: 0-->INDEPENDENT
COGNITION: 0 - NO COGNITION ISSUES REPORTED
BATHING: 0-->INDEPENDENT
SWALLOWING: 0-->SWALLOWS FOODS/LIQUIDS WITHOUT DIFFICULTY
AMBULATION: 0-->INDEPENDENT
TRANSFERRING: 0-->INDEPENDENT

## 2019-12-02 ASSESSMENT — MIFFLIN-ST. JEOR: SCORE: 1396.17

## 2019-12-02 NOTE — ANESTHESIA CARE TRANSFER NOTE
Patient: Mikki Knox    Procedure(s):  OPEN REDUCTION INTERNAL FIXATION, FRACTURE, RIGHT  ANKLE    Diagnosis: * No pre-op diagnosis entered *  Diagnosis Additional Information: No value filed.    Anesthesia Type:   General, ETT     Note:  Airway :Face Mask  Patient transferred to:PACU  Handoff Report: Identifed the Patient, Identified the Reponsible Provider, Reviewed the pertinent medical history, Discussed the surgical course, Reviewed Intra-OP anesthesia mangement and issues during anesthesia, Set expectations for post-procedure period and Allowed opportunity for questions and acknowledgement of understandingpost-procedure handoff checklist not completed for medical reasons      Vitals: (Last set prior to Anesthesia Care Transfer)    CRNA VITALS  12/2/2019 1532 - 12/2/2019 1612      12/2/2019             Pulse:  (!) 43    SpO2:  100 %    Resp Rate (observed):  20                Electronically Signed By: Dean Dennis Severson, APRN CRNA  December 2, 2019  4:12 PM

## 2019-12-02 NOTE — LETTER
[facility]  [address] [facility] [address]  [phone]  12/3/2019    TO WHOM IT MAY CONCERN               Mikki Knox (YOB: 1975) was admitted to the hospital on 12/2/2019 10:16 AM for medical care.     Patient is being discharged from the hospital today (12/4/2019).     Mikki Knox'  provided support and cares for her while hospitalized.      Thank you.   Nayeli Goetz DO MD  Internal Medicine   [facility] [address] [place] [address]  [phone]

## 2019-12-02 NOTE — PROGRESS NOTES
Patient discussed with Dr. Rinku Dominguez and imaging reviewed    Discussed with Dr. Starr who will do reduction under sedation and splint  NPO  Non-WB right LE  Pain medication as needed  Elevate LE as much as possible  OR this afternoon pending availability - tentatively scheduled  Pre-op optimization per hospitalist    Chana Sierra PAC

## 2019-12-02 NOTE — ED PROVIDER NOTES
History     Chief Complaint:  Trauma    HPI  Mikki Knox is a 43 year old female who presents with an ankle deformity. The patient slipped and fell outside at work this morning, landing on her right ankle. There is significant deformity to the ankle though she still reports normal sensation with no numbness or tingling. She did not hit her head or experience a loss of consciousness. EMS was called and they placed the patient in a splint. She was given 100 mcg of fentanyl and 2 of versed en route. The patient denies any other pain or injuries.     Allergies:  No known drug allergies    Medications:    guaiFENesin-codeine (ROBITUSSIN AC) 100-10 MG/5ML SOLN solution  metFORMIN (GLUCOPHAGE) 1000 MG tablet  metoclopramide (REGLAN) 10 MG tablet  triamcinolone (KENALOG) 0.1 % ointment    Past Medical History:    Type 2 diabetes mellitus  Hyperlipidemia   Tubo-ovarian abscess  Ovarian cyst     Past Surgical History:    Appendectomy     Family History:    The patient's family history includes Breast Cancer in her mother; Diabetes in her mother and paternal grandmother; Heart Disease in her maternal aunt and paternal grandmother; Hypertension in her mother.    Social History:  The patient reports that she has never smoked. She has never used smokeless tobacco. She reports current alcohol use. She reports that she does not use drugs.   PCP: Shelby Willis  Marital Status:     Review of Systems   Cardiovascular: Negative for chest pain.   Gastrointestinal: Negative for abdominal pain, nausea and vomiting.   Musculoskeletal: Positive for joint swelling (right ankle) and myalgias (right ankle). Negative for back pain and neck pain.   Skin: Negative for wound.   Neurological: Negative for syncope, weakness, numbness and headaches.   All other systems reviewed and are negative.      Physical Exam     Patient Vitals for the past 24 hrs:   BP Temp Temp src Pulse Heart Rate Resp SpO2 Height Weight   12/02/19 1200 (!) 152/86 --  "-- 77 80 11 99 % -- --   12/02/19 1148 -- -- -- -- -- -- 99 % -- --   12/02/19 1145 (!) 154/113 -- -- 69 -- 10 -- -- --   12/02/19 1141 -- -- -- -- -- 17 -- -- --   12/02/19 1140 -- -- -- -- 87 10 100 % -- --   12/02/19 1138 (!) 162/94 99  F (37.2  C) Oral 83 -- 8 100 % -- --   12/02/19 1135 (!) 162/94 -- -- 80 81 -- 100 % -- --   12/02/19 1130 (!) 162/96 -- -- 90 -- -- 100 % -- --   12/02/19 1125 -- -- -- -- -- -- 100 % -- --   12/02/19 1120 -- -- -- -- -- -- 100 % -- --   12/02/19 1115 (!) 156/88 -- -- 84 -- -- 99 % -- --   12/02/19 1100 (!) 146/88 -- -- 77 -- -- 100 % -- --   12/02/19 1030 (!) 176/108 -- -- 103 -- -- 98 % -- --   12/02/19 1018 (!) 178/109 99  F (37.2  C) Oral 103 -- 22 96 % 1.549 m (5' 1\") 79.4 kg (175 lb)     Physical Exam  Constitutional:       General: She is in acute distress.   HENT:      Right Ear: Tympanic membrane normal.      Left Ear: Tympanic membrane normal.      Mouth/Throat:      Pharynx: No posterior oropharyngeal erythema.   Eyes:      Conjunctiva/sclera: Conjunctivae normal.   Neck:      Musculoskeletal: Neck supple.   Cardiovascular:      Rate and Rhythm: Normal rate and regular rhythm.      Heart sounds: Normal heart sounds.   Pulmonary:      Effort: Pulmonary effort is normal.      Breath sounds: Normal breath sounds.   Abdominal:      General: Bowel sounds are normal. There is no distension.      Palpations: Abdomen is soft.      Tenderness: There is no abdominal tenderness. There is no guarding or rebound.   Musculoskeletal: Normal range of motion.      Right ankle: She exhibits deformity. Tenderness.   Skin:     General: Skin is warm and dry.   Neurological:      Mental Status: She is alert.         Emergency Department Course     Imaging:  Radiology findings were communicated with the patient who voiced understanding of the findings.    Ankle XR, G/E 3 views, right  IMPRESSION: Right ankle fracture-dislocation. The talus along with the  distal fibula are posterolaterally " dislocated. Fibular distal  diaphyseal oblique, slightly comminuted fracture is noted with marked  widening between the distal tibia and fibula indicating  syndesmosis/interosseous injury. On the lateral view, there is a  probable small posterior malleolar fracture. The medial malleolus is  only obliquely imaged but most likely intact. The deltoid ligament is  therefore likely completely torn.  Results per radiology.    Laboratory:  Laboratory findings were communicated with the patient who voiced understanding of the findings.    BMP: Glucose 331 (H), Creatinine 0.51 (L) o/w WNL  HCG qual: Negative    [facility]    -Dislocation - Lower Extremity  Date/Time: 12/2/2019 11:17 AM  Performed by: Aliza Starr MD  Authorized by: Aliza Starr MD     ED EVALUATION:      I have performed an Emergency Department Evaluation including taking a history and physical examination, this evaluation will be documented in the electronic medical record for this ED encounter.      ASA Class: Class 1- healthy patient  UNIVERSAL PROTOCOL   Site Marked: NA  Prior Images Obtained and Reviewed:  NA  Required items: Required blood products, implants, devices and special equipment available    Patient identity confirmed:  Verbally with patient  Patient was reevaluated immediately before administering moderate or deep sedation or anesthesia  Confirmation Checklist:  Patient's identity using two indicators, relevant allergies, procedure was appropriate and matched the consent or emergent situation and correct equipment/implants were available  Time out: Immediately prior to the procedure a time out was called      LOCATION     Location:  Ankle    PRE PROCEDURE DETAILS:     Distal perfusion: normal      Range of motion: reduced      SEDATION    Patient Sedated: Yes    Sedation Type:  Deep  Sedation:  Propofol  Vital signs: Vital signs monitored during sedation      PROCEDURE DETAILS      Manipulation performed: yes       Ankle reduction method:  Direct traction    Reduction successful: yes      Reduction confirmed with imaging: yes      Immobilization:  Splint    Splint type:  Short leg    Supplies used:  Plaster    POST PROCEDURE DETAILS      Neurological function: normal      Distal perfusion: normal      PROCEDURE   Length of time physician/provider present for 1:1 monitoring during sedation: 10      Interventions:  1034: Dilaudid 0.5mg IV  1139: Propofol 80mg IV    Emergency Department Course:  Past medical records, nursing notes, and vitals reviewed.  1028: I performed an exam of the patient and obtained history, as documented above. GCS 15.    IV inserted and blood drawn.    The patient was sent for a xray while in the emergency department, findings above.    1103: I discussed the case with Chana Sarah regarding the patient.    1124: I rechecked the patient. Explained findings to patient.    Findings and plan explained to the Patient who consents to admission.     1103: Discussed the patient with Chana Sarah, who will admit the patient to an surgical bed for further monitoring, evaluation, and treatment.     Impression & Plan      Medical Decision Making:  Mikki Knox is a 43 year old female who arrives in the emergency department by ambulance after a fall at work.  She had an obvious fracture dislocation of the ankle.  We have reduced this and splinted it pending orthopedic evaluation.  They plan to take the patient to the operating room this afternoon.  They requested CT scan after reduction for operative planning which is been ordered.  There is no evidence of other significant injury on exam.      Diagnosis:    ICD-10-CM    1. Closed trimalleolar fracture of right ankle, initial encounter S82.851A    2. Dislocation of right ankle joint, initial encounter S93.04XA        Disposition:   Admitted to surgery.      Scribe Disclosure:  Chana GLEZ, am serving as a scribe at 10:28 AM on 12/2/2019 to  document services personally performed by Aliza Starr MD based on my observations and the provider's statements to me.    [facility] EMERGENCY DEPARTMENT       Aliza Starr MD  12/02/19 [id number]

## 2019-12-02 NOTE — BRIEF OP NOTE
[facility]    Brief Operative Note    Pre-operative diagnosis: Chung C fecture dislocation of the right ankle  Post-operative diagnosis Same as pre-operative diagnosis    Procedure: Procedure(s):  OPEN REDUCTION INTERNAL FIXATION, FRACTURE, RIGHT  ANKLE  Surgeon: Surgeon(s) and Role:     * Rinku Dominguez MD - Primary     * Deidre Cummings PA-C - Assisting  Anesthesia: General   Estimated blood loss: Less than 10 ml  Drains: Hemovac  Specimens: * No specimens in log *  Findings:   Fracture.  Complications: None.  Implants:   Implant Name Type Inv. Item Serial No.  Lot No. LRB No. Used   ARTHREX KNOTLESS TIGHTROPE SYNDESMOSIS REPAIR IMPLANT    ARTHREX [id number] Right 1   IMP SCR SYN CORTEX 3.5X14MM SELF TAP .814 Metallic Hardware/[employer] IMP SCR SYN CORTEX 3.5X14MM SELF TAP .814  SYNTHES-STRATEC [id number] Right 5   IMP SCR SYN CORTEX 3.5X16MM SELF TAP .816 Metallic Hardware/[employer]arrard IMP SCR SYN CORTEX 3.5X16MM SELF TAP .816  SYNTHES-STRATEC 8002 [id number] Right 4   IMP PLATE SYN 1/3 TUBULAR 117MM 10H .401 Metallic Hardware/[employer]arrard IMP PLATE SYN 1/3 TUBULAR 117MM 10H .401  SY[employer]STRAT[employer] 8002 [id number] Right 1

## 2019-12-02 NOTE — ED TRIAGE NOTES
Patient at work and slipped & fell outside. Right ankle pain w/ deformity. EMS splinted ankle and gave medication. ABC's intact.

## 2019-12-02 NOTE — ANESTHESIA PREPROCEDURE EVALUATION
Anesthesia Pre-Procedure Evaluation    Patient: Mikki Knox   MRN: [id number] : 1975          Preoperative Diagnosis: * No pre-op diagnosis entered *    Procedure(s):  OPEN REDUCTION INTERNAL FIXATION, FRACTURE, RIGHT  ANKLE    Past Medical History:   Diagnosis Date     Diabetes mellitus (H)      Past Surgical History:   Procedure Laterality Date     APPENDECTOMY       Anesthesia Evaluation     .             ROS/MED HX    ENT/Pulmonary:      (-) asthma and sleep apnea   Neurologic:      (-) Neuropathy   Cardiovascular:        (-) hypertension, CAD, CHF, arrhythmias, pulmonary hypertension and dyslipidemia   METS/Exercise Tolerance:     Hematologic:        (-) anemia   Musculoskeletal:   (+) fracture lower extremity, -       GI/Hepatic:        (-) GERD and hepatitis   Renal/Genitourinary:         Endo:     (+) type II DM Obesity, .   (-) thyroid disease, chronic steroid usage and other endocrine disorder   Psychiatric:        (-) psychiatric history   Infectious Disease:  - neg infectious disease ROS       Malignancy:      - no malignancy   Other:    (+) No chance of pregnancy                         Physical Exam      Airway   Mallampati: II  TM distance: >3 FB  Neck ROM: full    Dental     Cardiovascular   Rhythm and rate: regular and normal  (-) no murmur    Pulmonary    breath sounds clear to auscultation    Other findings: Lab Test        17                       2328          0612          0818          WBC          5.6          7.8          13.2*         HGB          14.7         11.8         13.1          MCV          85           88           87            PLT          285          259          242            Lab Test        19                       1228          2328          1746          NA           136          136          134           POTASSIUM    3.9          3.6          3.7           CHLORIDE     105          100          99   "          CO2          23           28           26            BUN          9            8            13            CR           0.51*        0.60         0.59          ANIONGAP     8            8            9             LUCERO          8.6          8.9          8.4*          GLC          331*         248*         344*                Lab Results   Component Value Date    WBC 5.6 02/11/2017    HGB 14.7 02/11/2017    HCT 42.9 02/11/2017     02/11/2017     12/02/2019    POTASSIUM 3.9 12/02/2019    CHLORIDE 105 12/02/2019    CO2 23 12/02/2019    BUN 9 12/02/2019    CR 0.51 (L) 12/02/2019     (H) 12/02/2019    LUCERO 8.6 12/02/2019    ALBUMIN 3.9 02/11/2017    PROTTOTAL 7.8 02/11/2017    ALT 92 (H) 02/11/2017    AST 59 (H) 02/11/2017    ALKPHOS 66 02/11/2017    BILITOTAL 0.3 02/11/2017    LIPASE 65 01/11/2013    TSH 2.61 07/31/2012    HCG Negative 01/11/2013    HCGS Negative 12/02/2019       Preop Vitals  BP Readings from Last 3 Encounters:   12/02/19 (!) 152/86   05/20/18 161/83   02/12/17 124/90    Pulse Readings from Last 3 Encounters:   12/02/19 77   05/20/18 100   02/11/17 95      Resp Readings from Last 3 Encounters:   12/02/19 11   05/20/18 20   02/12/17 16    SpO2 Readings from Last 3 Encounters:   12/02/19 99%   05/20/18 99%   02/12/17 98%      Temp Readings from Last 1 Encounters:   12/02/19 99  F (37.2  C) (Oral)    Ht Readings from Last 1 Encounters:   12/02/19 1.549 m (5' 1\")      Wt Readings from Last 1 Encounters:   12/02/19 79.4 kg (175 lb)    Estimated body mass index is 33.07 kg/m  as calculated from the following:    Height as of this encounter: 1.549 m (5' 1\").    Weight as of this encounter: 79.4 kg (175 lb).       Anesthesia Plan      History & Physical Review  History and physical reviewed and following examination; no interval change.    ASA Status:  2 .    NPO Status:  > 8 hours    Plan for General and ETT with Propofol induction. Maintenance will be Balanced.    PONV prophylaxis:  " Ondansetron (or other 5HT-3)       Postoperative Care  Postoperative pain management:  IV analgesics and Oral pain medications.      Consents  Anesthetic plan, risks, benefits and alternatives discussed with:  Patient..                 Francisco Sprague MD                    .

## 2019-12-02 NOTE — CONSULTS
Consult Date:  12/02/2019      REASON FOR CONSULTATION:  Right ankle fracture after slip on the ice.      HISTORY OF PRESENT ILLNESS:  A 43-year-old female who presents with a significant ankle deformity.  She had a fracture-dislocation; this has now been reduced.  CT scan shows that she has no signs of crush injury posteriorly, although she has a very small posterior malleolar fragment.  She fell down outside at work this morning, landing on her ankle.  She was placed into a splint.      ALLERGIES:  NO KNOWN DRUG ALLERGIES.      PAST MEDICAL HISTORY:    Type 2 diabetes, hyperlipidemia, tubo-ovarian abscess and an ovarian cyst.      PAST SURGICAL HISTORY:  Includes appendectomy.      MEDICATIONS:   1.  Robitussin.   2.  Glucophage.   3.  Reglan.   4.  Kenalog ointment.        FAMILY HISTORY:  Diabetes and breast cancer.      SOCIAL HISTORY:  Never been a smoker.  She does use alcohol.  She is .      REVIEW OF SYSTEMS:  A 10-point review of systems was negative except for some positive myalgias around the broken ankle.      PHYSICAL EXAMINATION:     GENERAL:  She is a very pleasant female in no acute distress.   VITAL SIGNS:  She is afebrile.  Vital signs are stable.     EXTREMITIES:  She has good capillary refill and good sensation to the ankle and foot.  She has quite a bit of swelling.     RESPIRATORY:  She is breathing comfortably.     NECK:  She has good neck range of motion without pain.     HEENT:  Her head is atraumatic, normocephalic.   ABDOMEN:  Soft, nontender.      X-rays reveal a fracture-dislocation of the ankle with a posterior malleolar fragment.  The CT shows that this has been reduced.  There are no signs of crush injury.  No medial malleolar fracture.      PLAN:  I talked to her at length and we would fix the fracture of the fibula and then re-repair the attachment of the fibula and tibia using FiberWire or a TightRope.  She understood this.  I expect she will have to be nonweightbearing for  4-6 weeks if she shows healing forward or if she does shows healing later.  We may use a screw as well, but overall I think she can expect to have a reasonably good outcome after this procedure.      Risks and benefits were explained to her at length, and she has elected to proceed.         ANTHONY ALLRED MD             D: 2019   T: 2019   MT: ROBIN      Name:     MALIKA THOMPSON   MRN:      [id number]        Account:       [id number]   :      1975           Consult Date:  2019      Document: [id number]

## 2019-12-02 NOTE — PROGRESS NOTES
An ETCO2 monitor was placed on the pt with 4 LPM bled in. The Ambu bag, suction, and airways were setup and present in the room, but not needed. Pt was able to maintain airway throughout the procedure with no intervention needed ETCO2 levels were maintained between 32-36, SPO2 99%-100%, HR 69-90, RR 10-26, pt tolerated well and was awake and conversing appropriately after procedure.  [id number].

## 2019-12-02 NOTE — H&P
[facility]    History and Physical       Date of Admission:  12/2/2019    Assessment & Plan   Right mid fibula fracture, unstable right ankle joint  ORIF right ankle    Summary:  She is relatively healthy. She has been managing her type 2 DM with natural supplements and has stopped taking her Metformin 2 weeks ago due to GI disturbances and Insulin about one month ago. She is otherwise without issues. Denies any irregular heart beats, syncopal episodes, or SOB. She has not had any significant reduction in the amount of activity that she participates in/endurance. No recent illness. Denies any recent fevers, chills, nausea, vomiting, diarrhea or night sweats. Denies any urinary frequency urgency, etc. No family history of excessive bleeding or clotting.     DVT Prophylaxis: Lovenox post operatively  Code Status: Full Code    This patient was discussed with Dr. Rinku Dominguez who agrees with current plans as outlined above.    Disposition: Expected discharge in 1 days pending pain control and ambulation.    Chana Sierra PA-C    Primary Care Physician   Physician No Ref-Primary    Chief Complaint   Right ankle fracture/dislocation    History is obtained from the patient    History of Present Illness   Mikki Knox is a 43 year old female who presents with right ankle fracture dislocation after slipping and falling outside of her work this AM. She had noticeable deformity so EMS was called and she was brought to [facility] ED for further evaluation and treatment planning. Here she was given conscious sedation and the ankle was successfully reduced by Dr. Starr. She was then taken to CT for surgical planning. From there she was brought to OR for ORIF of right ankle with Dr. Rinku Dominguez for definitive fixation    Past Medical History    Past medical history reviewed with no previously diagnosed medical problems.    Past Surgical History   Past surgical history reviewed with no previous surgeries  "identified.    Prior to Admission Medications   Prior to Admission Medications   Prescriptions Last Dose Informant Patient Reported? Taking?   ibuprofen (ADVIL/MOTRIN) 200 MG tablet Past Month at Unknown time  Yes Yes   Sig: Take 200 mg by mouth every 6 hours as needed for mild pain   triamcinolone (KENALOG) 0.1 % external ointment Past Month at Unknown time  Yes Yes   Sig: Apply topically 3 times daily as needed for irritation To right hand      Facility-Administered Medications: None     Allergies   No Known Allergies    Social History   I have reviewed this patient's social history and updated it with pertinent information if needed. Mikki Knox  reports that she has never smoked. She has never used smokeless tobacco. She reports current alcohol use. She reports that she does not use drugs.    Family History   Family history reviewed with patient and is noncontributory.    Review of Systems   CONSTITUTIONAL: NEGATIVE for fever, chills, change in weight  ENT/MOUTH: NEGATIVE for ear, mouth and throat problems  RESP: NEGATIVE for significant cough or SOB  CV: NEGATIVE for chest pain, palpitations or peripheral edema    Physical Exam   Nursing Notes Reviewed.  /88   Pulse 78   Temp 97  F (36.1  C) (Temporal)   Resp 20   Ht 1.549 m (5' 1\")   Wt 79.4 kg (175 lb)   LMP 11/03/2019   SpO2 98%   BMI 33.07 kg/m     General:  Pleasant 42 yo female who appears stated age. Looks comfortable and in no acute dristress.  Skin:  Warm, dry. No rashes or lesions on exposed skin.  HEENT:  Normocephalic, atraumatic; EOMs intact and PERRL. MMM.  Neck:  Supple, no cervical lymphadenopathy.  Chest:  Breath sounds CTA and no increased work of breathing.  Cardiovascular:  RRR, no rub or murmur. No peripheral edema. Dorsalis pedis pulses detected and symmetric.  Abdomen:  Soft, non-tender, non-distended. Bowel sounds present.  Musculoskeletal:  Moves all four extremities. Right LE in splint. Able to actively move " toes  Neurological:  A&O x 3; CN 2-12 grossly intact.  Psychiatric:  Affect and mood congruent.    Data   Data reviewed today:  I personally reviewed no images or EKG's today.  Recent Labs   Lab 12/02/19  1228      POTASSIUM 3.9   CHLORIDE 105   CO2 23   BUN 9   CR 0.51*   ANIONGAP 8   LUCERO 8.6   *       Imaging:  Recent Results (from the past 24 hour(s))   Ankle XR, G/E 3 views, right    Narrative    RIGHT ANKLE THREE OR MORE VIEWS 12/2/2019 10:52 AM     HISTORY: Deformed, fall.      Impression    IMPRESSION: Right ankle fracture-dislocation. The talus along with the  distal fibula are posterolaterally dislocated. Fibular distal  diaphyseal oblique, slightly comminuted fracture is noted with marked  widening between the distal tibia and fibula indicating  syndesmosis/interosseous injury. On the lateral view, there is a  probable small posterior malleolar fracture. The medial malleolus is  only obliquely imaged but most likely intact. The deltoid ligament is  therefore likely completely torn.   CT Ankle Right w/o Contrast    Narrative    CT RIGHT ANKLE WITHOUT CONTRAST 12/2/2019 1:12 PM     HISTORY: Fracture, ankle.     Radiation dose for this scan was reduced using automated exposure  control, adjustment of the mA and/or kV according to patient size, or  iterative reconstruction technique.    FINDINGS: Since earlier today, the tibiotalar joint dislocation has  been reduced. The tibiotalar joint remains incongruent with widening  between the lateral aspect of the tibial plafond and talar dome and  between the medial malleolus and talus. There is also continued  widening between the anterior aspect of the tibia and distal fibula.  1.1 x 0.3 x 0.4 cm anterior joint line articular body is noted between  the tibia and talus. A smaller linear calcification or articular body  is also suspected. There is a fracture along the posterior margin of  the posterior malleolus without evidence of articular  surface  involvement. The medial malleolus appears to be intact. Fibular distal  diaphyseal oblique comminuted fracture is noted 6 cm proximal to the  tibiotalar joint. Lateral malleolar anterior spurring is noted which  could be related to chronic injury of the anterior talofibular  ligament. No talar dome osteochondral lesion is identified. There are  small calcaneal spurs at the Achilles tendon and plantar aponeurosis  attachments. Subtalar joint alignment appears grossly normal.      Impression    IMPRESSION:  1. Unstable tibiotalar joint injury with syndesmosis and interosseous  ligament tearing exiting through the fibula approximately 6 cm  proximal to the tibiotalar joint. There is also complete tear of the  deltoid ligament with widening between the medial malleolus and talus  but no apparent medial malleolar fracture.  2. Tibiotalar anterior joint line 1.1 x 0.3 x 0.4 cm articular body.  The donor site for this bone fragment is uncertain. A smaller  articular body is also suspected at the anterior joint line as well.  3. Posterior malleolar fracture without apparent involvement of the  articular surface.  4. 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  5. Small bubbles of gas along the lateral margin of the tibial distal  diametaphysis. It is unknown if the patient has a penetrating injury.

## 2019-12-02 NOTE — PHARMACY-ADMISSION MEDICATION HISTORY
Admission medication history interview status for this patient is complete. See [facility] admission navigator for allergy information, prior to admission medications and immunization status.     Medication history interview source(s):Patient  Medication history resources (including written lists, pill bottles, clinic record):[employer] list, [employer]  Primary pharmacy:[facility]    Changes made to PTA medication list:  Added: ibuprofen  Deleted: robitussin ac prn, metformin 1000mg bid, reglan 10g qid prn,  Changed: triamcinolone to tid prn per pt    Actions taken by pharmacist (provider contacted, etc):None     Additional medication history information:pt stopped taking metformin 1000 mg po twice daily about 2 weeks ago on her own, trying to control blood glucose naturally with a cactus/pineapple/cucumber drink/supplement daily.  Pt stopped Levemir insulin on her own as well 2 months ago    Medication reconciliation/reorder completed by provider prior to medication history? Yes, sticky note left for MD      For patients on insulin therapy:N-PT STOPPED ON HER OWN 2 months ago      Prior to Admission medications    Medication Sig Last Dose Taking? Auth Provider   ibuprofen (ADVIL/MOTRIN) 200 MG tablet Take 200 mg by mouth every 6 hours as needed for mild pain no recent usage Yes Unknown, Entered By History

## 2019-12-02 NOTE — ED NOTES
Bed: ED23  Expected date: 12/2/19  Expected time: 10:12 AM  Means of arrival: Ambulance  Comments:  BV3- ankle deformity

## 2019-12-03 ENCOUNTER — APPOINTMENT (OUTPATIENT)
Dept: PHYSICAL THERAPY | Facility: CLINIC | Age: 44
DRG: 494 | End: 2019-12-03
Attending: ORTHOPAEDIC SURGERY
Payer: OTHER MISCELLANEOUS

## 2019-12-03 LAB
GLUCOSE BLDC GLUCOMTR-MCNC: 210 MG/DL (ref 70–99)
GLUCOSE BLDC GLUCOMTR-MCNC: 212 MG/DL (ref 70–99)
GLUCOSE BLDC GLUCOMTR-MCNC: 231 MG/DL (ref 70–99)
GLUCOSE BLDC GLUCOMTR-MCNC: 286 MG/DL (ref 70–99)
GLUCOSE BLDC GLUCOMTR-MCNC: 307 MG/DL (ref 70–99)
GLUCOSE BLDC GLUCOMTR-MCNC: 309 MG/DL (ref 70–99)
GLUCOSE SERPL-MCNC: 246 MG/DL (ref 70–99)
HGB BLD-MCNC: 13.7 G/DL (ref 11.7–15.7)

## 2019-12-03 PROCEDURE — 25000132 ZZH RX MED GY IP 250 OP 250 PS 637: Performed by: PHYSICIAN ASSISTANT

## 2019-12-03 PROCEDURE — 97116 GAIT TRAINING THERAPY: CPT | Mod: GP | Performed by: PHYSICAL THERAPIST

## 2019-12-03 PROCEDURE — 25000128 H RX IP 250 OP 636: Performed by: ORTHOPAEDIC SURGERY

## 2019-12-03 PROCEDURE — 97162 PT EVAL MOD COMPLEX 30 MIN: CPT | Mod: GP | Performed by: PHYSICAL THERAPIST

## 2019-12-03 PROCEDURE — 00000146 ZZHCL STATISTIC GLUCOSE BY METER IP

## 2019-12-03 PROCEDURE — 97530 THERAPEUTIC ACTIVITIES: CPT | Mod: GP | Performed by: PHYSICAL THERAPIST

## 2019-12-03 PROCEDURE — 99222 1ST HOSP IP/OBS MODERATE 55: CPT | Performed by: INTERNAL MEDICINE

## 2019-12-03 PROCEDURE — 25000131 ZZH RX MED GY IP 250 OP 636 PS 637: Performed by: INTERNAL MEDICINE

## 2019-12-03 PROCEDURE — 12000000 ZZH R&B MED SURG/OB

## 2019-12-03 PROCEDURE — 36415 COLL VENOUS BLD VENIPUNCTURE: CPT | Performed by: ORTHOPAEDIC SURGERY

## 2019-12-03 PROCEDURE — 99207 ZZC CONSULT E&M CHANGED TO INITIAL LEVEL: CPT | Performed by: INTERNAL MEDICINE

## 2019-12-03 PROCEDURE — 82947 ASSAY GLUCOSE BLOOD QUANT: CPT | Performed by: ORTHOPAEDIC SURGERY

## 2019-12-03 PROCEDURE — 85018 HEMOGLOBIN: CPT | Performed by: ORTHOPAEDIC SURGERY

## 2019-12-03 PROCEDURE — 25000132 ZZH RX MED GY IP 250 OP 250 PS 637: Performed by: ORTHOPAEDIC SURGERY

## 2019-12-03 RX ORDER — ACETAMINOPHEN 325 MG/1
650 TABLET ORAL EVERY 6 HOURS PRN
Qty: 60 TABLET | Refills: 0 | Status: ON HOLD | OUTPATIENT
Start: 2019-12-03 | End: 2019-12-11

## 2019-12-03 RX ORDER — OXYCODONE HYDROCHLORIDE 5 MG/1
5-10 TABLET ORAL
Qty: 45 TABLET | Refills: 0 | Status: SHIPPED | OUTPATIENT
Start: 2019-12-03

## 2019-12-03 RX ORDER — ASPIRIN 325 MG
325 TABLET, DELAYED RELEASE (ENTERIC COATED) ORAL DAILY
Qty: 45 TABLET | Refills: 0 | Status: SHIPPED | OUTPATIENT
Start: 2019-12-04

## 2019-12-03 RX ORDER — POLYETHYLENE GLYCOL 3350 17 G/17G
17 POWDER, FOR SOLUTION ORAL DAILY PRN
Qty: 30 PACKET | Refills: 0 | Status: SHIPPED | OUTPATIENT
Start: 2019-12-03

## 2019-12-03 RX ORDER — ACETAMINOPHEN 325 MG/1
975 TABLET ORAL EVERY 8 HOURS
Qty: 60 TABLET | Refills: 0 | Status: SHIPPED | OUTPATIENT
Start: 2019-12-03

## 2019-12-03 RX ORDER — ONDANSETRON 4 MG/1
4 TABLET, ORALLY DISINTEGRATING ORAL EVERY 6 HOURS PRN
Qty: 10 TABLET | Refills: 0 | Status: SHIPPED | OUTPATIENT
Start: 2019-12-03

## 2019-12-03 RX ADMIN — INSULIN ASPART 3 UNITS: 100 INJECTION, SOLUTION INTRAVENOUS; SUBCUTANEOUS at 17:39

## 2019-12-03 RX ADMIN — ACETAMINOPHEN 975 MG: 325 TABLET, FILM COATED ORAL at 09:53

## 2019-12-03 RX ADMIN — INSULIN ASPART 4 UNITS: 100 INJECTION, SOLUTION INTRAVENOUS; SUBCUTANEOUS at 13:16

## 2019-12-03 RX ADMIN — HYDROMORPHONE HYDROCHLORIDE 0.5 MG: 1 INJECTION, SOLUTION INTRAMUSCULAR; INTRAVENOUS; SUBCUTANEOUS at 00:09

## 2019-12-03 RX ADMIN — CEFAZOLIN SODIUM 1 G: 1 INJECTION, SOLUTION INTRAVENOUS at 05:05

## 2019-12-03 RX ADMIN — OXYCODONE HYDROCHLORIDE 10 MG: 5 TABLET ORAL at 19:19

## 2019-12-03 RX ADMIN — OXYCODONE HYDROCHLORIDE 10 MG: 5 TABLET ORAL at 13:15

## 2019-12-03 RX ADMIN — OXYCODONE HYDROCHLORIDE 5 MG: 5 TABLET ORAL at 09:53

## 2019-12-03 RX ADMIN — OXYCODONE HYDROCHLORIDE 10 MG: 5 TABLET ORAL at 22:25

## 2019-12-03 RX ADMIN — INSULIN GLARGINE 5 UNITS: 100 INJECTION, SOLUTION SUBCUTANEOUS at 14:24

## 2019-12-03 RX ADMIN — OXYCODONE HYDROCHLORIDE 10 MG: 5 TABLET ORAL at 16:17

## 2019-12-03 RX ADMIN — SENNOSIDES AND DOCUSATE SODIUM 1 TABLET: 8.6; 5 TABLET ORAL at 19:20

## 2019-12-03 RX ADMIN — ACETAMINOPHEN 975 MG: 325 TABLET, FILM COATED ORAL at 02:08

## 2019-12-03 RX ADMIN — SENNOSIDES AND DOCUSATE SODIUM 1 TABLET: 8.6; 5 TABLET ORAL at 08:21

## 2019-12-03 RX ADMIN — HYDROMORPHONE HYDROCHLORIDE 0.5 MG: 1 INJECTION, SOLUTION INTRAMUSCULAR; INTRAVENOUS; SUBCUTANEOUS at 04:51

## 2019-12-03 RX ADMIN — Medication 1 LOZENGE: at 02:07

## 2019-12-03 RX ADMIN — INSULIN ASPART 2 UNITS: 100 INJECTION, SOLUTION INTRAVENOUS; SUBCUTANEOUS at 08:23

## 2019-12-03 RX ADMIN — HYDROMORPHONE HYDROCHLORIDE 0.5 MG: 1 INJECTION, SOLUTION INTRAMUSCULAR; INTRAVENOUS; SUBCUTANEOUS at 06:53

## 2019-12-03 RX ADMIN — ASPIRIN 325 MG: 325 TABLET, DELAYED RELEASE ORAL at 08:21

## 2019-12-03 RX ADMIN — ACETAMINOPHEN 975 MG: 325 TABLET, FILM COATED ORAL at 17:38

## 2019-12-03 ASSESSMENT — ACTIVITIES OF DAILY LIVING (ADL)
ADLS_ACUITY_SCORE: 12
ADLS_ACUITY_SCORE: 10
ADLS_ACUITY_SCORE: 12

## 2019-12-03 NOTE — PLAN OF CARE
Discharge Planner PT   Patient plan for discharge: home with spouse  Current status: PT jordyal cmpleted and treatment initiated, pt admitted with ankle fx from fall on the ice at work, s/p ORIF, NWB R LE, pt previously I at apt with spouse with 8 steps to enter.  Pt currently SBA for bed mob and tranfers with FWW, NWB on R LE, amb 60' with FWW with CGA, able to complete 8 steps after rest with 1 rail and 1 crutch with CGA.    Barriers to return to prior living situation: steps to enter, length of butler to amb with NWB and decreased act raymond  Recommendations for discharge: home with spouse  Rationale for recommendations: Pt will benefit from cont therapy to progress functional mob raymond, I and safety  to allow safe discharge home with spouse.  Pt will need both FWW and crutches for home use.       Entered by: ROBERT CUEVA 12/03/2019 9:36 AM

## 2019-12-03 NOTE — PLAN OF CARE
A&Ox4. VSS. A1 with walker, NWB to RLE. Tolerating diet well. Blood sugars monitored. CMS intact. Dressing CDI. Elevated RLE. Pt would like to discharge home tomorrow. Not passin ggas as of yet, BS hypoactive. Taking oxy prn 10mg for pain management which is effective. Work note given to her  per hospitalist. Discharge home tomorrow.

## 2019-12-03 NOTE — ANESTHESIA POSTPROCEDURE EVALUATION
Patient: Mikki Knox    Procedure(s):  OPEN REDUCTION INTERNAL FIXATION, FRACTURE, RIGHT  ANKLE    Diagnosis:* No pre-op diagnosis entered *  Diagnosis Additional Information: No value filed.    Anesthesia Type:  General, ETT    Note:  Anesthesia Post Evaluation    Patient location during evaluation: PACU  Patient participation: Able to fully participate in evaluation  Level of consciousness: awake  Pain management: adequate  Airway patency: patent  Cardiovascular status: acceptable  Respiratory status: acceptable  Hydration status: acceptable  PONV: none             Last vitals:  Vitals:    12/02/19 1700 12/02/19 1715 12/02/19 1751   BP: (!) 144/86 (!) 145/81 (!) 148/78   Pulse: 77 79    Resp: 23 22 22   Temp:      SpO2: 100% 100%          Electronically Signed By: Neil Espinoza MD  December 2, 2019  6:44 PM

## 2019-12-03 NOTE — OP NOTE
Procedure Date: 12/02/2019      PREOPERATIVE DIAGNOSIS:  Chung C fracture-dislocation of the right ankle with a posterior malleolar fragment extraarticular.        POSTOPERATIVE DIAGNOSIS:  Chung C fracture-dislocation of the right ankle with a posterior malleolar fragment extraarticular.      PROCEDURE:  Open reduction and internal fixation with a 1/3 tubular plate interfragmentary screw and a TightRope syndesmosis fixation.      SURGEON:  Rinku Dominguez MD      ASSISTANT:  Deidre Cummings PA-C      INDICATIONS:  This is a 43-year-old female who fell earlier today, suffering a fracture-dislocation of her ankle.  We obtained x-rays.  She was reduced in the emergency room, and x-rays revealed a significant syndesmotic injury, with the center of the fracture of the fibula being about 10 cm above the joint.  I talked about her CT and x-rays with her and recommended open reduction and internal fixation with a syndesmotic repair.  She was interested in having that done.      The risks, benefits and possible outcomes were explained at length, to include numbness into the foot around the incision, infection, bleeding, nerve and vessel damage, to name a few.      She received 2 grams of Ancef preoperatively.  She will be on 24 hours of IV antibiotics.  She will be given DVT prophylaxis with aspirin.      DESCRIPTION OF PROCEDURE:  The patient was brought to the operating room, underwent general anesthesia, placed supine on the operative table with a bump underneath her right hip.  She then had a thigh tourniquet applied and then prepped and draped in a sterile fashion.  We paused to ensure the correct laterality and procedure.      We made a longitudinal incision at what appeared to be the center of her fracture and extended it both distally and proximally.  We came down through the fascial layer over the fibula distally and divided that, elevated it subperiosteally, and then divided the muscles anteriorly and  posteriorly.  She had a large posterior butterfly fragment, so we first fixed the butterfly fragment on the proximal side with a 3.5 interfragmentary screw and then fixed the distal fragment.  We reduced the distal fragment anatomically and then put in our plate and laid it on there.  We fixed 1 screw distally, 1 screw proximally and then checked our reduction, which was near anatomic.  We then put 4 screws total above the fracture and 4 distal.  I then placed a TightRope across the ankle joint and tightened that down with the ankle dorsiflexed.  This came together very nicely.  It really secured her syndesmosis.  We then removed all the portions of the TightRope that needed to be removed.  We then irrigated the wound copiously, closed the fascial layer with 0 Vicryl.  We closed the skin with 2-0 Vicryl and staples.  She was placed into a sterile compressive dressing after about 20 mL of Marcaine was put into the wound and around the skin.      At the end of the case, sponge, and needle count were correct x2.  There were no apparent complications.  She tolerated the procedure very well.         ANTHONY ALLRED MD             D: 2019   T: 2019   MT: GUERA      Name:     MALIKA THOMPSON   MRN:      [id number]        Account:        [id number]   :      1975           Procedure Date: 2019      Document: [id number]

## 2019-12-03 NOTE — PROGRESS NOTES
"ORTHOPEDIC PROGRESS NOTE LOWER EXTREMITY    PROCEDURE: internal fixaton right ankle fracture  POD 1  PAIN: moderate  NAUSEA: absent    Blood pressure (!) 159/79, pulse 79, temperature 99.1  F (37.3  C), temperature source Oral, resp. rate 18, height 1.549 m (5' 1\"), weight 79.4 kg (175 lb), last menstrual period 2019, SpO2 100 %, not currently breastfeeding.    Temp (24hrs), Av.4  F (36.9  C), Min:97  F (36.1  C), Max:99.2  F (37.3  C)    Body mass index is 33.07 kg/m .          Intake/Output Summary (Last 24 hours) at 12/3/2019 0817  Last data filed at 12/3/2019 0507  Gross per 24 hour   Intake 2629 ml   Output 2110 ml   Net 519 ml       Patient Vitals for the past 48 hrs:   BP Temp Temp src Pulse Heart Rate Resp SpO2 Height Weight   19 0726 (!) 159/79 99.1  F (37.3  C) Oral -- 90 18 100 % -- --   19 0446 132/65 99.2  F (37.3  C) Oral -- 92 29 98 % -- --   19 0100 -- -- -- -- -- 18 -- -- --   19 0044 137/75 98.4  F (36.9  C) Oral -- 102 -- 100 % -- --   19 1956 (!) 167/85 -- -- -- 95 22 -- -- --   19 1848 134/76 -- -- -- 76 22 -- -- --   19 1820 (!) 151/77 -- -- -- 93 23 -- -- --   19 1751 (!) 148/78 -- -- -- 80 22 -- -- --   19 1715 (!) 145/81 -- -- 79 73 22 100 % -- --   19 1700 (!) 144/86 -- -- 77 70 23 100 % -- --   19 1645 (!) 142/80 97.3  F (36.3  C) Temporal 73 75 23 100 % -- --   19 1630 (!) 140/80 -- -- 67 67 23 100 % -- --   19 1625 -- -- -- -- 66 16 100 % -- --   19 1620 127/77 -- -- 69 64 13 100 % -- --   19 1615 132/75 -- -- 68 67 21 100 % -- --   19 1610 126/75 -- -- 67 62 20 100 % -- --   19 1605 124/80 -- -- 67 -- -- 90 % -- --   19 1331 135/88 97  F (36.1  C) Temporal 78 -- 20 98 % -- --   19 1315 131/77 -- -- 69 80 20 97 % -- --   19 1300 138/80 -- -- -- -- -- -- -- --   19 1230 (!) 141/86 -- -- 68 72 13 99 % -- --   19 1215 (!) 147/87 -- -- 77 78 17 99 % -- " "--   12/02/19 1200 (!) 152/86 -- -- 77 80 11 99 % -- --   12/02/19 1148 -- -- -- -- -- -- 99 % -- --   12/02/19 1145 (!) 154/113 -- -- 69 -- 10 -- -- --   12/02/19 1145 (!) 154/113 -- -- 93 75 10 100 % -- --   12/02/19 1141 -- -- -- -- -- 17 -- -- --   12/02/19 1140 -- -- -- -- 87 10 100 % -- --   12/02/19 1138 (!) 162/94 99  F (37.2  C) Oral 83 -- 8 100 % -- --   12/02/19 1135 (!) 162/94 -- -- 80 81 -- 100 % -- --   12/02/19 1130 (!) 162/96 -- -- 90 -- -- 100 % -- --   12/02/19 1125 -- -- -- -- -- -- 100 % -- --   12/02/19 1120 -- -- -- -- -- -- 100 % -- --   12/02/19 1115 (!) 156/88 -- -- 84 -- -- 99 % -- --   12/02/19 1100 (!) 146/88 -- -- 77 -- -- 100 % -- --   12/02/19 1030 (!) 176/108 -- -- 103 -- -- 98 % -- --   12/02/19 1018 (!) 178/109 99  F (37.2  C) Oral 103 -- 22 96 % 1.549 m (5' 1\") 79.4 kg (175 lb)        Recent Labs   Lab Test 12/03/19  0612 12/02/19 1807 02/11/17 2328   HGB 13.7 14.8 14.7     No results for input(s): INR in the last 82485 hours.  Recent Labs   Lab Test 12/02/19 1807 02/11/17 2328 01/13/13  0612    285 259     Recent Labs   Lab Test 12/02/19  1807 02/11/17  2328 01/13/13  0612   WBC 11.7* 5.6 7.8     Invalid input(s): K      EXAM   The patient is healthy, alert and no distress  Calves are soft and non-tender.   Sensation is intact.  Dorsiflexion and plantar flexion is intact.  Dorsalis pedis pulses intact.   The incision is covered.     Patient Active Problem List   Diagnosis     Hyperlipidemia LDL goal <100     Diabetes mellitus, type 2 (H)     DM type 2, goal A1C 7-8     Tubo-ovarian abscess     Ovarian cyst     Ankle fracture, bimalleolar, closed, right, initial encounter     Closed right ankle fracture         ASSESSMENT  Doing well   PLAN  Plan to be NWB 6 weeks  Needs letter for  to take off time to help at home      Rinku Dominguez M.D  [facility]  [address] [place] [id number]      "

## 2019-12-03 NOTE — CONSULTS
[facility]  Hospitalist Consultation    Date of Admission:  12/2/2019    Assessment & Plan   Mikki Knox is a 43 year old female with a past medical history of hyperlipidemia and type 2 diabetes diet controlled who was admitted on 12/2/2019. I was asked to see the patient for management of chronic medical problems.    POD 1 Internal Fixation Right Ankle Fracture  -Orthopedic surgery primary - appreciate consult  -Pain management and cares per primary team; stool softener ordered      DMII diet controlled  Patient was previously prescribed metformin but did not tolerate medication due to GI upset.  She had been on 16 units of long-acting insulin which she had recently discontinued in the last month.  She had just started checking her blood sugars at home and reports that they were in the 200s.  She been attempting diet control of her diabetes.  -Insulin glargine 5 units every morning, insulin aspart 5 units 3 times daily with meals, and sliding scale insulin  - Point-of-care glucose testing before meals at bedtime  -Hypoglycemia protocol  - Goal glucose less than 160    Hyperlipidemia  -Not on medications     DVT Prophylaxis: Defer to primary service  Code Status: Full Code    Disposition: Expected discharge in 1-2 days once cleared by Orthopedic surgery. Note provided for .     Nayeli Goetz,     Reason for Consult   Reason for consult: I was asked by Dr. Rinku Dominguez to evaluate this patient for management of chronic medical conditions.    Primary Care Physician   No physician listed     Chief Complaint   Fall with right ankle fracture   History is obtained from the patient and review or records    History of Present Illness   Mikki Knox is a 43 year old female who presents with right ankle fracture following a fall.    Patient slipped on the ice and fell on her right ankle outside her work.  She came to the ER on 12/2 due to deformity of the right ankle.  She had normal sensation without  numbness or tingling in the foot.  Imaging shows a closed right malar fracture of the right ankle with dislocation.  Orthopedic surgery admitted the patient complete internal fixation on 12/3/2019.  I am seeing the patient postoperatively.  She is awake and tolerating her diet.  She is not needing oxygen.  She is denying chest pain, palpitations, shortness of breath, or cough.  She has no abdominal pain she is not currently having bowel movements.  No dysuria or hematuria.  Has pain in the right ankle.  Has good sensation of bilateral toes.  No signs of cyanosis of the right foot.   is at bedside and is updated and discussed with nursing.  Have requested a work note for .    Past Medical History    I have reviewed this patient's medical history and updated it with pertinent information if needed.   Past Medical History:   Diagnosis Date     Diabetes mellitus (H)      Hyperlipidemia      Ovarian cyst      Tubo-ovarian abscess      Past Surgical History   I have reviewed this patient's surgical history and updated it with pertinent information if needed.  Past Surgical History:   Procedure Laterality Date     APPENDECTOMY       Prior to Admission Medications   Prior to Admission Medications   Prescriptions Last Dose Informant Patient Reported? Taking?   ibuprofen (ADVIL/MOTRIN) 200 MG tablet Past Month at Unknown time  Yes Yes   Sig: Take 200 mg by mouth every 6 hours as needed for mild pain   triamcinolone (KENALOG) 0.1 % external ointment Past Month at Unknown time  Yes No   Sig: Apply topically 3 times daily as needed for irritation To right hand      Facility-Administered Medications: None     Allergies   No Known Allergies    Social History   I have reviewed this patient's social history and updated it with pertinent information if needed. Mikki Knox  reports that she has never smoked. She has never used smokeless tobacco. She reports current alcohol use. She reports that she does not use  drugs.    Family History   Family history reviewed with patient and is noncontributory.    Review of Systems   The 10 point Review of Systems is negative other than noted in the HPI.     Physical Exam   Temp: 99.1  F (37.3  C) Temp src: Oral BP: (!) 159/79 Pulse: 79 Heart Rate: 90 Resp: 18 SpO2: 100 % O2 Device: None (Room air) Oxygen Delivery: 10 LPM  Vital Signs with Ranges  Temp:  [97  F (36.1  C)-99.2  F (37.3  C)] 99.1  F (37.3  C)  Pulse:  [67-79] 79  Heart Rate:  [] 90  Resp:  [13-29] 18  BP: (124-167)/(65-88) 159/79  SpO2:  [90 %-100 %] 100 %  175 lbs 0 oz     Constitutional: Awake, alert, cooperative, no apparent distress.  HEENT: Moist mucous membranes, poor dentition - discoloration to teeth.Conjunctiva and pupils examined and normal.  Respiratory: Clear to auscultation bilaterally, no crackles or wheezing.  Cardiovascular: Regular rate and rhythm, normal S1 and S2, and no murmur noted.  GI: Soft, non-distended, non-tender, normal bowel sounds.  Lymph/Hematologic: No anterior cervical or supraclavicular adenopathy.  Skin: No rashes, no cyanosis, no edema.  Musculoskeletal:  Right ankle post-op dressing and ace wrapped.   Neurologic: Cranial nerves 2-12 intact, normal strength and sensation. Moves upper and lower extremities.   Psychiatric: Alert, oriented to person, place and time, no obvious anxiety or depression.    Data   -Data reviewed today: All pertinent laboratory and imaging results from this encounter were reviewed.    Recent Labs   Lab 12/03/19  0612 12/02/19  1807 12/02/19  1228   WBC  --  11.7*  --    HGB 13.7 14.8  --    MCV  --  88  --    PLT  --  291  --    NA  --   --  136   POTASSIUM  --   --  3.9   CHLORIDE  --   --  105   CO2  --   --  23   BUN  --   --  9   CR  --   --  0.51*   ANIONGAP  --   --  8   LUCERO  --   --  8.6   *  --  331*       Recent Results (from the past 24 hour(s))   XR Surgery AL Fluoro L/T 5 Min w Stills    Narrative    This exam was marked as  non-reportable because it will not be read by a   radiologist or a [place] non-radiologist provider.

## 2019-12-03 NOTE — PLAN OF CARE
Up to floor at 1715- capno on  Patient alert and oriented  Peripheral IV infusing continuous fluids  Pain controlled with IV dilaudid twiceand scheduled tylenol  Ankle cast in place, CDI  CMS intact  Up assist x2, walker and belt into bathroom- could be assist x1 pivot to bedside commode  Voided 700 this shift  Not passing gas, no BM  Tolerating clears  Lungs clear  Pain increased with movement, non-weight bearing  Continue with plan of care

## 2019-12-03 NOTE — PROVIDER NOTIFICATION
Patient here with uncontrolled DM, post op ankle ORIF, needing BG check orders    Admitting Hospitalist paged

## 2019-12-03 NOTE — PLAN OF CARE
PT: Attempted to see pt in PM however visitors present, declining therapy session requesting PT return in AM

## 2019-12-03 NOTE — CONSULTS
Care Coordinator acknowledged consult for workmens comp questions. CC does not have workmens comp knowledge. IF pt has questions with hospital billing she may contact financial counselor or billing office. 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 Consult cleared.    Yancy Alfaro RN, BSN CTS  Care Coordinator  [facility]   [phone]

## 2019-12-03 NOTE — PROGRESS NOTES
"CLINICAL NUTRITION SERVICES  -  ASSESSMENT NOTE    Recommendations Ordered by Registered Dietitian (RD):   Continue diet as ordered    Malnutrition:   % Weight Loss:  None noted  % Intake:  No decreased intake noted  Subcutaneous Fat Loss:  None observed  Muscle Loss:  None observed  Fluid Retention:  None noted    Malnutrition Diagnosis: Patient does not meet two of the above criteria necessary for diagnosing malnutrition      REASON FOR ASSESSMENT  Mikki Knox is a 43 year old female seen by Registered Dietitian for Admission Nutrition Risk Screen for new/uncontrolled diabetes    NUTRITION HISTORY  - Information obtained from patient and chart   - Pt admitted for right mid fibula fracture, unstable right ankle joint   - History of type 2 DM --> recently stopped taking Metformin and insulin to manage with natural supplements instead   - Prior to admission, pt stated that she was eating well. Typically consumes meals TID.   - Breakfast: coffee, toast or soup and salad. Pineapple/cactus/cucmber drink --> natural supplement to help with diabetes?   - Lunch: beans, rice, etc   - Dinner: bowl of cereal or pasta from [employer]   - [facility]    CURRENT NUTRITION ORDERS  Diet Order:     Regular Diet     Current Intake/Tolerance:  No information has been recorded in the flowsheets to comment on. Per the meal ordering system, pt has ordered 2 meals since admission.     NUTRITION FOCUSED PHYSICAL ASSESSMENT FOR DIAGNOSING MALNUTRITION)  Yes --> visual only              Observed:    No nutrition-related physical findings observed    Obtained from Chart/Interdisciplinary Team:  - Last BM not recorded in the I/O   - Pt underwent open reduction and internal fixation of the right ankle on 12/2    ANTHROPOMETRICS  Height: 5' 1\"  Weight: 79.4 kg ( 175 lbs 0 oz)   Body mass index is 33.07 kg/m .  Weight Status: Obesity Grade I BMI 30-34.9  IBW: 47.7 kg (105 lbs)   % IBW: 166% +/- 10%  Weight History: very little weight history but " appears stable. Pt has not noticed any weight loss recently, noting that her usual body weight is around 175-178 lbs.     Care Everywhere:   7/2/19: 78 kg (172 lbs)   Wt Readings from Last 10 Encounters:   12/02/19 79.4 kg (175 lb)   02/11/17 79.3 kg (174 lb 13.2 oz)   02/09/17 79.4 kg (175 lb)   12/07/16 79.4 kg (175 lb)   04/22/13 81.6 kg (180 lb)   04/08/13 80.9 kg (178 lb 4.8 oz)   02/25/13 79.6 kg (175 lb 6.4 oz)   02/04/13 77.2 kg (170 lb 4.8 oz)   01/31/13 77.1 kg (170 lb)   01/11/13 75.9 kg (167 lb 5.3 oz)     LABS  Labs reviewed    Electrolytes  Potassium (mmol/L)   Date Value   12/02/2019 3.9   02/11/2017 3.6   12/07/2016 3.7    Blood Glucose  Glucose (mg/dL)   Date Value   12/03/2019 246 (H)   12/02/2019 331 (H)   02/11/2017 248 (H)   12/07/2016 344 (H)   04/13/2013 130 (H)     Hemoglobin A1C (%)   Date Value   04/13/2013 5.8   01/12/2013 8.1 (H)   07/24/2012 10.4 (H)    Inflammatory Markers  WBC (10e9/L)   Date Value   12/02/2019 11.7 (H)   02/11/2017 5.6   01/13/2013 7.8     Albumin (g/dL)   Date Value   02/11/2017 3.9   04/13/2013 4.2   01/14/2013 3.5 (L)      Sodium (mmol/L)   Date Value   12/02/2019 136   02/11/2017 136   12/07/2016 134    Renal  Urea Nitrogen (mg/dL)   Date Value   12/02/2019 9   02/11/2017 8   12/07/2016 13   04/13/2013 10   01/14/2013 3 (L)     Creatinine (mg/dL)   Date Value   12/02/2019 0.51 (L)   02/11/2017 0.60   12/07/2016 0.59   04/13/2013 0.63   01/14/2013 0.57     Additional  Triglycerides (mg/dL)   Date Value   04/13/2013 118   07/31/2012 190 (H)     Ketones Urine (mg/dL)   Date Value   01/11/2013 >150 (A)        MEDICATIONS  Medications reviewed    Reviewed insulin regimen   Senna-Docusate 1-2 tablets, 2 x daily     ASSESSED NUTRITION NEEDS PER APPROVED PRACTICE GUIDELINES:    Dosing Weight 79.4 kg   Estimated Energy Needs: 9885-1211 kcals (20-25 Kcal/Kg)  Justification: maintenance  Estimated Protein Needs:  grams protein (1.2-1.5 g pro/Kg)  Justification:  post-op  Estimated Fluid Needs: >1 mL/Kcal  Justification: maintenance    MALNUTRITION:  % Weight Loss:  None noted  % Intake:  No decreased intake noted  Subcutaneous Fat Loss:  None observed  Muscle Loss:  None observed  Fluid Retention:  None noted    Malnutrition Diagnosis: Patient does not meet two of the above criteria necessary for diagnosing malnutrition    NUTRITION DIAGNOSIS:  Increased nutrient needs (protein) related to pt post-op day #1 as evidenced by pt needing a minimum of 1.2-1.5 g/kg of protein to aide in healing.     NUTRITION INTERVENTIONS  Recommendations / Nutrition Prescription  Continue diet as ordered     Implementation  Nutrition education:     Assessed learning needs, learning preferences, and willingness to learn    Nutrition Education (Content):  a) Provided handout type 2 diabetes nutrition therapy via nutrition care manual   b) Briefly discussed label reading, CHO content of foods, importance of checking blood glucose levels and th importance of taking medication as prescribed/ not supplementing with other means    Nutrition Education (Application):  a) Discussed eating habits and recommended alternative food choices    Patient verbalizes understanding of diet by engaging in conversation and asking questions     Anticipate fair compliance    Diet Education - refer to Education Flowsheet    Collaboration and Referral of Nutrition care: dicussed pt with RN    Nutrition Goals  Pt to consume % of meals ordered TID with consideration of CHO content     MONITORING AND EVALUATION:  Progress towards goals will be monitored and evaluated per protocol and Practice Guidelines    Venessa Vásquez RD, LD  Clinical Dietitian

## 2019-12-03 NOTE — PROGRESS NOTES
12/03/19 0800   Quick Adds   Type of Visit Initial PT Evaluation   Living Environment   Lives With spouse   Living Arrangements apartment   Home Accessibility no concerns   Self-Care   Equipment Currently Used at Home none   Activity/Exercise/Self-Care Comment works as    Functional Level Prior   Ambulation 0-->independent   Transferring 0-->independent   Toileting 0-->independent   Bathing 0-->independent   Communication 0-->understands/communicates without difficulty   Swallowing 0-->swallows foods/liquids without difficulty   Cognition 0 - no cognition issues reported   Fall history within last six months yes   Number of times patient has fallen within last six months 1   Which of the above functional risks had a recent onset or change? none   General Information   Onset of Illness/Injury or Date of Surgery - Date 12/02/19   Referring Physician Rinku Dominguez   Patient/Family Goals Statement pt plans to discharge home with spouse   Pertinent History of Current Problem (include personal factors and/or comorbidities that impact the POC) S/P ORIF R ankle after fall on ice'   Precautions/Limitations fall precautions   Weight-Bearing Status - LUE full weight-bearing   Weight-Bearing Status - RUE full weight-bearing   Weight-Bearing Status - LLE full weight-bearing   Weight-Bearing Status - RLE nonweight-bearing   General Observations family present, Estonian speaking   Cognitive Status Examination   Orientation orientation to person, place and time   Level of Consciousness alert   Follows Commands and Answers Questions 100% of the time   Personal Safety and Judgment intact   Memory intact   Pain Assessment   Patient Currently in Pain Yes, see Vital Sign flowsheet  (7/10)   Integumentary/Edema   Integumentary/Edema Comments R ankle post op   Range of Motion (ROM)   ROM Comment R ankle limited post op, casted   Strength   Strength Comments decreased mm endurance limiting raymond for act with NWB   Bed Mobility  "  Bed Mobility Comments bed mob with SBA supine to sit   Transfer Skills   Transfer Comments sit to stand with SBA and FWW, cues for safety   Gait   Gait Comments amb with CGA and FWW, NWB,  cues for mechanics   Balance   Balance Comments impaired standing static and dynamic balance with NWB on R LE   Coordination   Coordination no deficits were identified   General Therapy Interventions   Planned Therapy Interventions transfer training;gait training   Clinical Impression   Criteria for Skilled Therapeutic Intervention yes, treatment indicated   PT Diagnosis difficulty walking   Influenced by the following impairments post op pain, decreased strength, balance and act raymond, NWB status R LE   Functional limitations due to impairments impaired functional mob I and safety   Clinical Presentation Evolving/Changing   Clinical Presentation Rationale 3-5 deficits   Clinical Decision Making (Complexity) Moderate complexity   Therapy Frequency 2x/day   Predicted Duration of Therapy Intervention (days/wks) 3 days   Anticipated Equipment Needs at Discharge front wheeled walker;crutches   Anticipated Discharge Disposition Home with Assist   Risk & Benefits of therapy have been explained Yes   Patient, Family & other staff in agreement with plan of care Yes   [facility] [employer] TM \"6 Clicks\"   2016, Trustees of [facility], under license to [employer].  All rights reserved.   6 Clicks Short Forms Basic Mobility Inpatient Short Form   [facility] [employer]  \"6 Clicks\" V.2 Basic Mobility Inpatient Short Form   1. Turning from your back to your side while in a flat bed without using bedrails? 4 - None   2. Moving from lying on your back to sitting on the side of a flat bed without using bedrails? 4 - None   3. Moving to and from a bed to a chair (including a wheelchair)? 4 - None   4. Standing up from a chair using your arms (e.g., wheelchair, or bedside chair)? 4 - None   5. To walk in hospital room? 3 - A Little "   6. Climbing 3-5 steps with a railing? 3 - A Little   Basic Mobility Raw Score (Score out of 24.Lower scores equate to lower levels of function) 22   Total Evaluation Time   Total Evaluation Time (Minutes) 12

## 2019-12-04 ENCOUNTER — APPOINTMENT (OUTPATIENT)
Dept: OCCUPATIONAL THERAPY | Facility: CLINIC | Age: 44
DRG: 494 | End: 2019-12-04
Payer: OTHER MISCELLANEOUS

## 2019-12-04 ENCOUNTER — APPOINTMENT (OUTPATIENT)
Dept: PHYSICAL THERAPY | Facility: CLINIC | Age: 44
DRG: 494 | End: 2019-12-04
Payer: OTHER MISCELLANEOUS

## 2019-12-04 VITALS
BODY MASS INDEX: 33.04 KG/M2 | HEIGHT: 61 IN | SYSTOLIC BLOOD PRESSURE: 136 MMHG | WEIGHT: 175 LBS | RESPIRATION RATE: 16 BRPM | TEMPERATURE: 97.6 F | DIASTOLIC BLOOD PRESSURE: 72 MMHG | OXYGEN SATURATION: 98 % | HEART RATE: 86 BPM

## 2019-12-04 LAB
ERYTHROCYTE [DISTWIDTH] IN BLOOD BY AUTOMATED COUNT: 12.3 % (ref 10–15)
GLUCOSE BLDC GLUCOMTR-MCNC: 228 MG/DL (ref 70–99)
GLUCOSE BLDC GLUCOMTR-MCNC: 265 MG/DL (ref 70–99)
GLUCOSE BLDC GLUCOMTR-MCNC: 343 MG/DL (ref 70–99)
GLUCOSE SERPL-MCNC: 229 MG/DL (ref 70–99)
HCT VFR BLD AUTO: 42 % (ref 35–47)
HGB BLD-MCNC: 14.3 G/DL (ref 11.7–15.7)
INTERPRETATION ECG - MUSE: NORMAL
MCH RBC QN AUTO: 29.6 PG (ref 26.5–33)
MCHC RBC AUTO-ENTMCNC: 34 G/DL (ref 31.5–36.5)
MCV RBC AUTO: 87 FL (ref 78–100)
PLATELET # BLD AUTO: 299 10E9/L (ref 150–450)
RBC # BLD AUTO: 4.83 10E12/L (ref 3.8–5.2)
WBC # BLD AUTO: 9.2 10E9/L (ref 4–11)

## 2019-12-04 PROCEDURE — 97166 OT EVAL MOD COMPLEX 45 MIN: CPT | Mod: GO

## 2019-12-04 PROCEDURE — 85027 COMPLETE CBC AUTOMATED: CPT | Performed by: ORTHOPAEDIC SURGERY

## 2019-12-04 PROCEDURE — 99233 SBSQ HOSP IP/OBS HIGH 50: CPT | Performed by: INTERNAL MEDICINE

## 2019-12-04 PROCEDURE — 25000132 ZZH RX MED GY IP 250 OP 250 PS 637: Performed by: PHYSICIAN ASSISTANT

## 2019-12-04 PROCEDURE — 00000146 ZZHCL STATISTIC GLUCOSE BY METER IP

## 2019-12-04 PROCEDURE — 97535 SELF CARE MNGMENT TRAINING: CPT | Mod: GO

## 2019-12-04 PROCEDURE — 82947 ASSAY GLUCOSE BLOOD QUANT: CPT | Performed by: ORTHOPAEDIC SURGERY

## 2019-12-04 PROCEDURE — 36415 COLL VENOUS BLD VENIPUNCTURE: CPT | Performed by: ORTHOPAEDIC SURGERY

## 2019-12-04 PROCEDURE — 97116 GAIT TRAINING THERAPY: CPT | Mod: GP

## 2019-12-04 PROCEDURE — 25000131 ZZH RX MED GY IP 250 OP 636 PS 637: Performed by: INTERNAL MEDICINE

## 2019-12-04 PROCEDURE — 25000132 ZZH RX MED GY IP 250 OP 250 PS 637: Performed by: ORTHOPAEDIC SURGERY

## 2019-12-04 RX ADMIN — ASPIRIN 325 MG: 325 TABLET, DELAYED RELEASE ORAL at 09:36

## 2019-12-04 RX ADMIN — INSULIN ASPART 2 UNITS: 100 INJECTION, SOLUTION INTRAVENOUS; SUBCUTANEOUS at 09:37

## 2019-12-04 RX ADMIN — OXYCODONE HYDROCHLORIDE 10 MG: 5 TABLET ORAL at 14:22

## 2019-12-04 RX ADMIN — INSULIN ASPART 3 UNITS: 100 INJECTION, SOLUTION INTRAVENOUS; SUBCUTANEOUS at 12:14

## 2019-12-04 RX ADMIN — ACETAMINOPHEN 975 MG: 325 TABLET, FILM COATED ORAL at 01:41

## 2019-12-04 RX ADMIN — SENNOSIDES AND DOCUSATE SODIUM 1 TABLET: 8.6; 5 TABLET ORAL at 09:36

## 2019-12-04 RX ADMIN — INSULIN GLARGINE 10 UNITS: 100 INJECTION, SOLUTION SUBCUTANEOUS at 10:31

## 2019-12-04 RX ADMIN — ACETAMINOPHEN 975 MG: 325 TABLET, FILM COATED ORAL at 09:35

## 2019-12-04 RX ADMIN — OXYCODONE HYDROCHLORIDE 10 MG: 5 TABLET ORAL at 09:36

## 2019-12-04 RX ADMIN — OXYCODONE HYDROCHLORIDE 10 MG: 5 TABLET ORAL at 01:41

## 2019-12-04 ASSESSMENT — ACTIVITIES OF DAILY LIVING (ADL)
ADLS_ACUITY_SCORE: 12
PREVIOUS_RESPONSIBILITIES: MEAL PREP;HOUSEKEEPING;LAUNDRY;SHOPPING;MEDICATION MANAGEMENT;FINANCES;DRIVING;WORK
ADLS_ACUITY_SCORE: 12

## 2019-12-04 NOTE — PROGRESS NOTES
Reviewed discharge instructions and medications with patient. Questions answered. Patient discharged to home with spouse driving, discharge instructions, medications (Aspirin/Tylenol/Oxycodone/Miralax/Zofran/Lantus), and belongings at this time.

## 2019-12-04 NOTE — DISCHARGE INSTRUCTIONS
No weight bearing on your surgical foot until told otherwise by your surgeon.    Care of your cast:  This information was prepared for you to help you take of your cast. Please read each section carefully and ask your physician if you have any questions.    What to Check For:  1. Check your toes for color changes, swelling, loss of motion, numbness, tingling or severe pain. Call your physician if these symptoms occur.  2. If swelling is noted during the first 24 to 48 hours, elevate the extremity higher than your  head. After this time, elevate it whenever you are in bed.  3. Check cast for undesirable odors or drainage. Also check for any areas of local pain under your cast. These may be signs of an area of pressure under the cast.  4. Be sure any padding used is well anchored to the cast. Be careful not to pull padding out. Without the padding, loose material may slip into cast and cause irritation.    How to Care for Your Cast:  1. Avoid bumping or knocking the cast against any hard object. Cover rough areas with tape.  2. Never trim or cut down the length of your cast. Please call your physician if the cast becomes loose, broken or cracked.  3. If skin under the cast begins to itch, do not use anything to scratch under the cast since it may break the skin and cause an infection.  4. You may wash areas around the cast, but do not saturate the cast in the process.     While on narcotic pain medication, to avoid constipation:  1. Drink plenty of water to keep well hydrated  2. May take over the counter Colace or Senna (use as directed on label)    Call your physician (   ) if you experience/notice any of the followin. Fever greater than 100 degrees with body chills or excessive sweating.  2. Bleeding or large amounts of drainage at incision site or on the dressing/cast.   3. A bluish color, increased swelling, loss of motion, increased numbness/tingling or severe pain of toes.  2. Unusually foul odor from  toes.  3. Unusual drainage (blood is expected if there has been surgery).  4. Broken, cracked or very loose cast.  5. Localized pain under cast not controlled with oral pain medications, ice and rest.   6. No bowel movement in 3 days (may use Milk of Magnesia or other over the counter remedy).  7. Chest pain, shortness of breath, and/or calf pain with excessive swelling.  8. Generalized feeling of illness.  9. Any other questions or concerns related to your surgery/recovery.    Thank you for allowing [facility] to participate in your cares!!

## 2019-12-04 NOTE — PLAN OF CARE
Pt is A/O, VSS- except max temp 100.1- encouraged IS & gave PO Tylenol. Pain managed with PO Oxycodone. CMS intact. RLE splinted- encouraged elevation. Tolerating regular diet, monitored BG. Up with Ax1 walker and gait belt- NonWB RLE. Plan is to discharge home today.

## 2019-12-04 NOTE — PLAN OF CARE
Pt A&O, Vitals monitored, max temp 100.2.  Pt up with Ax1, gb and walker.  Non WB RLE.  Pain managed with 10mg oxycodone.  CMS intact.  Ace wrap/cast padding CDI.  Voiding adequately.  BG monitored.  Pt showered this dee, plans to discharge home Wednesday am.

## 2019-12-04 NOTE — PROGRESS NOTES
12/04/19 0943   Quick Adds   Type of Visit Initial Occupational Therapy Evaluation   Living Environment   Lives With spouse   Living Arrangements apartment   Home Accessibility stairs to enter home   Number of Stairs, Main Entrance 8   Transportation Anticipated car, drives self;family or friend will provide   Living Environment Comment Pt lives with spouse with apartment, 8 steps to enter, tub/shower, standard height toilet   Self-Care   Usual Activity Tolerance good   Current Activity Tolerance moderate   Functional Level   Ambulation 0-->independent   Transferring 0-->independent   Toileting 0-->independent   Bathing 0-->independent   Dressing 0-->independent   Eating 0-->independent   Communication 0-->understands/communicates without difficulty   Swallowing 0-->swallows foods/liquids without difficulty   Cognition 0 - no cognition issues reported   Fall history within last six months yes   Number of times patient has fallen within last six months 1   Which of the above functional risks had a recent onset or change? transferring;toileting;bathing;dressing   Prior Functional Level Comment Pt reports indep in all ADLs, IADLs and mobility tasks with no AD at baseline. Pt works full-time as  at a [employer].    General Information   Onset of Illness/Injury or Date of Surgery - Date 12/02/19   Referring Physician Rinku Dominguez MD   Patient/Family Goals Statement Pt's goal is to d/c home   Additional Occupational Profile Info/Pertinent History of Current Problem Per chart: Pt is a 43 year old female admitted s/p fall on ice at work resulting in R ankle fracture, now s/p ORIF   Pain Assessment   Patient Currently in Pain Yes, see Vital Sign flowsheet  (4/10 pain in RLE)   Range of Motion (ROM)   ROM Comment BUEs WFL   Strength   Strength Comments BUEs WFL   Hand Strength   Hand Strength Comments WFL   Bed Mobility Skill: Sit to Supine   Level of [place]: Sit/Supine stand-by assist   Physical  Assist/Nonphysical Assist: Sit/Supine 1 person assist   Bed Mobility Skill: Supine to Sit   Level of [place]: Supine/Sit stand-by assist   Physical Assist/Nonphysical Assist: Supine/Sit 1 person assist   Transfer Skill: Bed to Chair/Chair to Bed   Level of [place]: Bed to Chair stand-by assist   Physical Assist/Nonphysical Assist: Bed to Chair 1 person assist   Weight-Bearing Restrictions nonweight-bearing   Assistive Device - Transfer Skill Bed to Chair Chair to Bed Rehab Eval rolling walker   Transfer Skill: Sit to Stand   Level of [place]: Sit/Stand stand-by assist   Physical Assist/Nonphysical Assist: Sit/Stand 1 person assist   Transfer Skill: Sit to Stand nonweight-bearing   Assistive Device for Transfer: Sit/Stand rolling walker   Transfer Skill: Toilet Transfer   Level of [place]: Toilet stand-by assist   Physical Assist/Nonphysical Assist: Toilet 1 person assist   Weight-Bearing Restrictions: Toilet nonweight-bearing   Assistive Device rolling walker   Balance   Balance Comments CGA/SBA while in stance FWW level while maintaining NWB to RLE   Upper Body Dressing   Physical Assist/Nonphysical Assist: Dress Upper Body set-up required;1 person assist   Lower Body Dressing   Level of [place]: Dress Lower Body minimum assist (75% patients effort)   Physical Assist/Nonphysical Assist: Dress Lower Body 1 person assist   Toileting   Level of [place]: Toilet stand-by assist   Physical Assist/Nonphysical Assist: Toilet 1 person assist   Grooming   Level of [place]: Grooming stand-by assist   Physical Assist/Nonphysical Assist: Grooming 1 person assist   Instrumental Activities of Daily Living (IADL)   Previous Responsibilities meal prep;housekeeping;laundry;shopping;medication management;finances;driving;work   IADL Comments Pt will have support from spouse as needed for IADLs   Activities of Daily Living Analysis   Impairments Contributing to Impaired Activities of Daily Living  "balance impaired;pain;post surgical precautions   General Therapy Interventions   Planned Therapy Interventions ADL retraining;IADL retraining;transfer training   Clinical Impression   Criteria for Skilled Therapeutic Interventions Met yes, treatment indicated   OT Diagnosis Impaired ADLs, IADls and mobility tasks   Influenced by the following impairments Decreased functional activity tolerance, pain, post-surgical precautions   Assessment of Occupational Performance 5 or more Performance Deficits   Identified Performance Deficits Bathing, dressing, grooming, toileting, homemaking, transfers, work   Clinical Decision Making (Complexity) Moderate complexity   Therapy Frequency Daily   Predicted Duration of Therapy Intervention (days/wks) eval and 1x treat   Anticipated Discharge Disposition Home with Assist   Risks and Benefits of Treatment have been explained. Yes   Patient, Family & other staff in agreement with plan of care Yes   [facility] AM-PAC  \"6 Clicks\" Daily Activity Inpatient Short Form   1. Putting on and taking off regular lower body clothing? 3 - A Little   2. Bathing (including washing, rinsing, drying)? 3 - A Little   3. Toileting, which includes using toilet, bedpan or urinal? 4 - None   4. Putting on and taking off regular upper body clothing? 4 - None   5. Taking care of personal grooming such as brushing teeth? 4 - None   6. Eating meals? 4 - None   Daily Activity Raw Score (Score out of 24.Lower scores equate to lower levels of function) 22   Total Evaluation Time   Total Evaluation Time (Minutes) 10     "

## 2019-12-04 NOTE — PLAN OF CARE
Discharge Planner PT   Patient plan for discharge: home, likely today  Current status: Pt seated in chair upon arrival, agreeable to PT. Sit<>stand with FWW & supervision, demos mod ind level, able to maintain NWB status. Ambulated 1x80', 1x60' with FWW and supervision with WC available for seated rest as needed, able to maintain NWB status, demos mod ind level and appropriately self-selects distance/ rest breaks. Performed 2x4 steps with L rail and 1 crutch with close SBA. Pt seated in chair in room upon departure, spouse in room.    Barriers to return to prior living situation: none anticipated  Recommendations for discharge: Home with assist for stairs, Home PT for assessment of home set-up  Rationale for recommendations: Patient moving well, demos mobility appropriate for discharge home with assist of spouse for stairs. Pt will benefit from Home PT to assess home set-up and program to build activity tolerance.    Physical Therapy Discharge Summary    Reason for therapy discharge:    Discharged to home.    Progress towards therapy goal(s). See goals on Care Plan in [facility] electronic health record for goal details.  Goals partially met.  Barriers to achieving goals:   discharge from facility.    Therapy recommendation(s):    Continued therapy is recommended.  Rationale/Recommendations:  Pt will benefit from Home PT to assess home set-up and program to build activity tolerance..           Entered by: Yane Taveras 12/04/2019 10:54 AM

## 2019-12-04 NOTE — PLAN OF CARE
OT: Order received, eval completed and treatment initiated. Pt is a 43 year old female admitted s/p fall on ice at work resulting in R ankle fracture, now s/p ORIF. Pt lives with spouse with apartment, 8 steps to enter, tub/shower, standard height toilet. Pt reports indep in all ADLs, IADLs and mobility tasks with no AD at baseline. Pt works full-time as  at a restaurant.     Discharge Planner OT   Patient plan for discharge: Home w/ spouse  Current status: Pt completed bed mobility supine <> sit EOB with SBA. Pt completed sit > stand from EOB to FWW with CGA/SBA, ambulated to/from BR FWW level with CGA/SBA while maintaining NWB RLE. Pt completed toilet transfer and toileting tasks with SBA. Pt educated on compensatory technique for LB dressing, required min A for threading pants over RLE, CGA/SBA to don up over hips. Pt educated on tub transfer technique with use of extended tub bench in order to maintain NWB during bathing, able to perform transfer with use of bench with SBA, good carryover and return demo of technique. Pt and spouse educated on home safety modifications/recommendations, DME/AE with resource provided and work comp  updated and safe activities post ankle surgery, verbalized understanding. Pt with report of 3/10 pain in RLE with activity.   Barriers to return to prior living situation: Stairs, NWB RLE  Recommendations for discharge: Home w/ spouse assist for LB dressing, bathing and IADLs (homemaking, driving, laundry, meal prep)  Rationale for recommendations: Pt doing well, anticipate with spouse's support, will be able to return home safely. Recommend extended tub transfer bench for safe tub transfer in home environment. Planned discharge home today.        Entered by: Miladis March 12/04/2019 10:38 AM       Occupational Therapy Discharge Summary    Reason for therapy discharge:    Discharged to home.  All goals and outcomes met, no further needs identified.    Progress towards  therapy goal(s). See goals on Care Plan in [facility] electronic health record for goal details.  Goals met    Therapy recommendation(s):    No further skilled OT needs.

## 2019-12-04 NOTE — PROGRESS NOTES
[facility]  Hospitalist Progress Note      Assessment & Plan   Mikki Knox is a 43 year old female with a past medical history of hyperlipidemia and type 2 diabetes diet controlled who was admitted on 12/2/2019. I was asked to see the patient for management of chronic medical problems.    POD 2 Internal Fixation Right Ankle Fracture  -Orthopedic surgery primary - appreciate consult  -Pain management and cares per primary team; stool softener ordered    -Discharge to home today     DMII diet controlled  Patient was previously prescribed metformin but did not tolerate medication due to GI upset.  She had been on 16 units of long-acting insulin which she had recently discontinued in the last month.  She had just started checking her blood sugars at home and reports that they were in the 200s.  She been attempting diet control of her diabetes.  -Insulin glargine 5 units every morning (increaed to 10 units qAM), insulin aspart 5 units 3 times daily with meals, and sliding scale insulin  - Point-of-care glucose testing before meals at bedtime  -Hypoglycemia protocol  - Goal glucose less than 160  -Discharge with 16 units of insulin gargine pends (prior dose), counseled on importance of glucose control, recommended ACHS glucose checks at home (patient has supplies), and PCP follow-up for further adjustments     Pain induced hypertension  -BP slightly elevated likely secondary to pain. Will have checked on PCP follow-up    Low grade temperature  -No additional signs of infection  -Encourage incentive spirometry  -Given instructions on signs/symptoms to monitor for at home on discharge     Hyperlipidemia  -Not on medications      DVT Prophylaxis: Defer to primary service  Code Status: Full Code     Disposition: Discharge home today     Nayeli Goetz, DO  Text Page (7am - 6pm, M-F)    Interval History   Feeling well this AM. Pain is improving and controlled with medications. Low-grade temp overnight resolved. No  signs of infection. Likely associated with recent surgery. Feels comfortable to return home. Passing gas. No family present and discussed with nursing.     -Data reviewed today: I reviewed all new labs and imaging results over the last 24 hours.     Physical Exam   Temp: 97.6  F (36.4  C) Temp src: Oral BP: 136/72 Pulse: 86 Heart Rate: 91 Resp: 16 SpO2: 98 % O2 Device: None (Room air)    Vitals:    12/02/19 1018   Weight: 79.4 kg (175 lb)     Vital Signs with Ranges  Temp:  [97.6  F (36.4  C)-100.1  F (37.8  C)] 97.6  F (36.4  C)  Pulse:  [81-86] 86  Heart Rate:  [91] 91  Resp:  [14-16] 16  BP: (129-157)/(60-77) 136/72  SpO2:  [96 %-100 %] 98 %  I/O last 3 completed shifts:  In: 240 [P.O.:240]  Out: 3200 [Urine:3200]    Constitutional: Awake, alert, cooperative, no apparent distress.  HEENT: Moist mucous membranes  Respiratory: Clear to auscultation bilaterally, no crackles or wheezing.  Cardiovascular: Regular rate and rhythm, normal S1 and S2, and no murmur noted.  GI: Soft, non-distended, non-tender, normal bowel sounds.  Skin: No edema.  Musculoskeletal:  Right ankle post-op dressing and ace wrapped.   Neurologic: Moves upper and lower extremities    Medications       acetaminophen  975 mg Oral Q8H     aspirin  325 mg Oral Daily     insulin aspart  5 Units Subcutaneous TID w/meals     insulin aspart  1-7 Units Subcutaneous TID AC     insulin aspart  1-5 Units Subcutaneous At Bedtime     insulin glargine  10 Units Subcutaneous QAM AC     senna-docusate  1 tablet Oral BID    Or     senna-docusate  2 tablet Oral BID       Data   Recent Labs   Lab 12/04/19  0628 12/03/19  0612 12/02/19  1807 12/02/19  1228   WBC  --   --  11.7*  --    HGB 14.3 13.7 14.8  --    MCV  --   --  88  --    PLT  --   --  291  --    NA  --   --   --  136   POTASSIUM  --   --   --  3.9   CHLORIDE  --   --   --  105   CO2  --   --   --  23   BUN  --   --   --  9   CR  --   --   --  0.51*   ANIONGAP  --   --   --  8   LUCERO  --   --   --  8.6    GLC  --  246*  --  331*       No results found for this or any previous visit (from the past 24 hour(s)).

## 2019-12-04 NOTE — PROGRESS NOTES
Orthopedic Surgery  Mikki Knox  2019  Admit Date:  2019  POD: 2 Days Post-Op   Procedure(s):  Open reduction and internal fixation with a 1/3 tubular plate interfragmentary screw and a TightRope syndesmosis fixation    Alert and orient to person, place, and time.  Patient resting comfortably in bed.    Pain controlled.  Tolerating oral intake.    Denies nausea or vomiting  Denies chest pain or shortness of breath    Vital Sign Ranges  Temperature Temp  Av.2  F (37.3  C)  Min: 97.6  F (36.4  C)  Max: 100.1  F (37.8  C)   Blood pressure Systolic (24hrs), Av , Min:129 , Max:157        Diastolic (24hrs), Av, Min:60, Max:77      Pulse Pulse  Av.5  Min: 81  Max: 86   Respirations Resp  Avg: 15.7  Min: 14  Max: 16   Pulse oximetry SpO2  Av %  Min: 96 %  Max: 100 %       Splint is clean, dry, and intact.   Minimal erythema of the surrounding skin.   Right lower extremity is NVI.  Sensation intact bilateral lower extremities  +Dp pulse    Labs:  Recent Labs   Lab Test 19  1228 17  2328 16  1746   POTASSIUM 3.9 3.6 3.7     Recent Labs   Lab Test 19  0628 19  0612 19  1807   HGB 14.3 13.7 14.8     No results for input(s): INR in the last 05319 hours.  Recent Labs   Lab Test 19  0628 19  1807 17  2328    291 285       1. PLAN:   Continue ASA for DVT prophylaxis.     Mobilize with PT/OT    Non-WB Right LE.     Continue current pain regiment.   Dressings: Keep intact.     Follow-up: 2 weeks Dr Dominguez    2. Disposition   Anticipate d/c to home today    Jessenia Vargas PA-C

## 2019-12-07 ENCOUNTER — HOSPITAL ENCOUNTER (INPATIENT)
Facility: CLINIC | Age: 44
LOS: 3 days | Discharge: HOME | DRG: 862 | End: 2019-12-11
Attending: NURSE PRACTITIONER | Admitting: HOSPITALIST
Payer: OTHER MISCELLANEOUS

## 2019-12-07 DIAGNOSIS — E11.9 TYPE 2 DIABETES MELLITUS WITHOUT COMPLICATION, WITH LONG-TERM CURRENT USE OF INSULIN (H): ICD-10-CM

## 2019-12-07 DIAGNOSIS — Z79.4 TYPE 2 DIABETES MELLITUS WITHOUT COMPLICATION, WITH LONG-TERM CURRENT USE OF INSULIN (H): ICD-10-CM

## 2019-12-07 DIAGNOSIS — R73.9 HYPERGLYCEMIA: ICD-10-CM

## 2019-12-07 DIAGNOSIS — S82.841A ANKLE FRACTURE, BIMALLEOLAR, CLOSED, RIGHT, INITIAL ENCOUNTER: Primary | ICD-10-CM

## 2019-12-07 DIAGNOSIS — T81.40XA POST OP INFECTION: ICD-10-CM

## 2019-12-07 LAB
ANION GAP SERPL CALCULATED.3IONS-SCNC: 9 MMOL/L (ref 3–14)
BASOPHILS # BLD AUTO: 0.1 10E9/L (ref 0–0.2)
BASOPHILS NFR BLD AUTO: 0.7 %
BUN SERPL-MCNC: 14 MG/DL (ref 7–30)
CALCIUM SERPL-MCNC: 8.9 MG/DL (ref 8.5–10.1)
CHLORIDE SERPL-SCNC: 98 MMOL/L (ref 94–109)
CO2 SERPL-SCNC: 25 MMOL/L (ref 20–32)
CREAT SERPL-MCNC: 0.45 MG/DL (ref 0.52–1.04)
DIFFERENTIAL METHOD BLD: NORMAL
EOSINOPHIL # BLD AUTO: 0.2 10E9/L (ref 0–0.7)
EOSINOPHIL NFR BLD AUTO: 2.5 %
ERYTHROCYTE [DISTWIDTH] IN BLOOD BY AUTOMATED COUNT: 11.9 % (ref 10–15)
GFR SERPL CREATININE-BSD FRML MDRD: >90 ML/MIN/{1.73_M2}
GLUCOSE SERPL-MCNC: 408 MG/DL (ref 70–99)
HCT VFR BLD AUTO: 42.7 % (ref 35–47)
HGB BLD-MCNC: 14.5 G/DL (ref 11.7–15.7)
IMM GRANULOCYTES # BLD: 0 10E9/L (ref 0–0.4)
IMM GRANULOCYTES NFR BLD: 0.2 %
LACTATE BLD-SCNC: 1.4 MMOL/L (ref 0.7–2)
LYMPHOCYTES # BLD AUTO: 2.4 10E9/L (ref 0.8–5.3)
LYMPHOCYTES NFR BLD AUTO: 26.8 %
MCH RBC QN AUTO: 28.9 PG (ref 26.5–33)
MCHC RBC AUTO-ENTMCNC: 34 G/DL (ref 31.5–36.5)
MCV RBC AUTO: 85 FL (ref 78–100)
MONOCYTES # BLD AUTO: 0.6 10E9/L (ref 0–1.3)
MONOCYTES NFR BLD AUTO: 6.9 %
NEUTROPHILS # BLD AUTO: 5.6 10E9/L (ref 1.6–8.3)
NEUTROPHILS NFR BLD AUTO: 62.9 %
NRBC # BLD AUTO: 0 10*3/UL
NRBC BLD AUTO-RTO: 0 /100
PLATELET # BLD AUTO: 380 10E9/L (ref 150–450)
POTASSIUM SERPL-SCNC: 3.8 MMOL/L (ref 3.4–5.3)
RBC # BLD AUTO: 5.01 10E12/L (ref 3.8–5.2)
SODIUM SERPL-SCNC: 132 MMOL/L (ref 133–144)
WBC # BLD AUTO: 9 10E9/L (ref 4–11)

## 2019-12-07 PROCEDURE — 36415 COLL VENOUS BLD VENIPUNCTURE: CPT | Performed by: NURSE PRACTITIONER

## 2019-12-07 PROCEDURE — 96361 HYDRATE IV INFUSION ADD-ON: CPT

## 2019-12-07 PROCEDURE — 80048 BASIC METABOLIC PNL TOTAL CA: CPT | Performed by: NURSE PRACTITIONER

## 2019-12-07 PROCEDURE — 96365 THER/PROPH/DIAG IV INF INIT: CPT

## 2019-12-07 PROCEDURE — 85025 COMPLETE CBC W/AUTO DIFF WBC: CPT | Performed by: NURSE PRACTITIONER

## 2019-12-07 PROCEDURE — 96375 TX/PRO/DX INJ NEW DRUG ADDON: CPT

## 2019-12-07 PROCEDURE — 25000128 H RX IP 250 OP 636: Performed by: NURSE PRACTITIONER

## 2019-12-07 PROCEDURE — 25000132 ZZH RX MED GY IP 250 OP 250 PS 637: Performed by: NURSE PRACTITIONER

## 2019-12-07 PROCEDURE — 82805 BLOOD GASES W/O2 SATURATION: CPT | Performed by: NURSE PRACTITIONER

## 2019-12-07 PROCEDURE — 25800030 ZZH RX IP 258 OP 636: Performed by: NURSE PRACTITIONER

## 2019-12-07 PROCEDURE — 99285 EMERGENCY DEPT VISIT HI MDM: CPT | Mod: 25

## 2019-12-07 PROCEDURE — 83605 ASSAY OF LACTIC ACID: CPT | Performed by: NURSE PRACTITIONER

## 2019-12-07 PROCEDURE — 82010 KETONE BODYS QUAN: CPT | Performed by: NURSE PRACTITIONER

## 2019-12-07 RX ORDER — AMPICILLIN AND SULBACTAM 2; 1 G/1; G/1
3 INJECTION, POWDER, FOR SOLUTION INTRAMUSCULAR; INTRAVENOUS ONCE
Status: COMPLETED | OUTPATIENT
Start: 2019-12-07 | End: 2019-12-08

## 2019-12-07 RX ORDER — IBUPROFEN 600 MG/1
600 TABLET, FILM COATED ORAL ONCE
Status: COMPLETED | OUTPATIENT
Start: 2019-12-07 | End: 2019-12-07

## 2019-12-07 RX ORDER — HYDROMORPHONE HYDROCHLORIDE 1 MG/ML
0.5 INJECTION, SOLUTION INTRAMUSCULAR; INTRAVENOUS; SUBCUTANEOUS ONCE
Status: COMPLETED | OUTPATIENT
Start: 2019-12-07 | End: 2019-12-07

## 2019-12-07 RX ORDER — ACETAMINOPHEN 500 MG
1000 TABLET ORAL ONCE
Status: COMPLETED | OUTPATIENT
Start: 2019-12-07 | End: 2019-12-07

## 2019-12-07 RX ADMIN — IBUPROFEN 600 MG: 600 TABLET, FILM COATED ORAL at 22:55

## 2019-12-07 RX ADMIN — HYDROMORPHONE HYDROCHLORIDE 0.5 MG: 1 INJECTION, SOLUTION INTRAMUSCULAR; INTRAVENOUS; SUBCUTANEOUS at 23:25

## 2019-12-07 RX ADMIN — SODIUM CHLORIDE 1000 ML: 9 INJECTION, SOLUTION INTRAVENOUS at 22:56

## 2019-12-07 RX ADMIN — AMPICILLIN SODIUM AND SULBACTAM SODIUM 3 G: 2; 1 INJECTION, POWDER, FOR SOLUTION INTRAMUSCULAR; INTRAVENOUS at 23:41

## 2019-12-07 RX ADMIN — ACETAMINOPHEN 1000 MG: 500 TABLET, FILM COATED ORAL at 22:55

## 2019-12-07 ASSESSMENT — ENCOUNTER SYMPTOMS
ARTHRALGIAS: 1
COUGH: 0
DYSURIA: 0
FEVER: 1
CHILLS: 1
DIFFICULTY URINATING: 0

## 2019-12-08 PROBLEM — R50.9 FEVER: Status: ACTIVE | Noted: 2019-12-08

## 2019-12-08 LAB
ALBUMIN UR-MCNC: NEGATIVE MG/DL
APPEARANCE UR: CLEAR
BASE EXCESS BLDV CALC-SCNC: 3.2 MMOL/L
BILIRUB UR QL STRIP: NEGATIVE
COLOR UR AUTO: ABNORMAL
GLUCOSE BLDC GLUCOMTR-MCNC: 174 MG/DL (ref 70–99)
GLUCOSE BLDC GLUCOMTR-MCNC: 219 MG/DL (ref 70–99)
GLUCOSE BLDC GLUCOMTR-MCNC: 231 MG/DL (ref 70–99)
GLUCOSE BLDC GLUCOMTR-MCNC: 240 MG/DL (ref 70–99)
GLUCOSE BLDC GLUCOMTR-MCNC: 320 MG/DL (ref 70–99)
GLUCOSE BLDC GLUCOMTR-MCNC: 347 MG/DL (ref 70–99)
GLUCOSE BLDC GLUCOMTR-MCNC: 370 MG/DL (ref 70–99)
GLUCOSE BLDC GLUCOMTR-MCNC: 384 MG/DL (ref 70–99)
GLUCOSE UR STRIP-MCNC: >1000 MG/DL
HCO3 BLDV-SCNC: 29 MMOL/L (ref 21–28)
HGB UR QL STRIP: ABNORMAL
KETONES BLD-SCNC: 0.1 MMOL/L (ref 0–0.6)
KETONES UR STRIP-MCNC: NEGATIVE MG/DL
LEUKOCYTE ESTERASE UR QL STRIP: NEGATIVE
NITRATE UR QL: NEGATIVE
O2/TOTAL GAS SETTING VFR VENT: ABNORMAL %
OXYHGB MFR BLDV: 64 %
PCO2 BLDV: 47 MM HG (ref 40–50)
PH BLDV: 7.4 PH (ref 7.32–7.43)
PH UR STRIP: 6.5 PH (ref 5–7)
PO2 BLDV: 32 MM HG (ref 25–47)
RBC #/AREA URNS AUTO: 19 /HPF (ref 0–2)
SOURCE: ABNORMAL
SP GR UR STRIP: 1.02 (ref 1–1.03)
SQUAMOUS #/AREA URNS AUTO: 1 /HPF (ref 0–1)
UROBILINOGEN UR STRIP-MCNC: NORMAL MG/DL (ref 0–2)
WBC #/AREA URNS AUTO: 1 /HPF (ref 0–5)

## 2019-12-08 PROCEDURE — 99223 1ST HOSP IP/OBS HIGH 75: CPT | Mod: AI | Performed by: HOSPITALIST

## 2019-12-08 PROCEDURE — 25000132 ZZH RX MED GY IP 250 OP 250 PS 637: Performed by: HOSPITALIST

## 2019-12-08 PROCEDURE — 99232 SBSQ HOSP IP/OBS MODERATE 35: CPT | Performed by: INTERNAL MEDICINE

## 2019-12-08 PROCEDURE — 00000146 ZZHCL STATISTIC GLUCOSE BY METER IP

## 2019-12-08 PROCEDURE — 25000131 ZZH RX MED GY IP 250 OP 636 PS 637: Performed by: HOSPITALIST

## 2019-12-08 PROCEDURE — 25000131 ZZH RX MED GY IP 250 OP 636 PS 637: Performed by: NURSE PRACTITIONER

## 2019-12-08 PROCEDURE — 12000000 ZZH R&B MED SURG/OB

## 2019-12-08 PROCEDURE — 81001 URINALYSIS AUTO W/SCOPE: CPT | Performed by: NURSE PRACTITIONER

## 2019-12-08 PROCEDURE — 25000128 H RX IP 250 OP 636: Performed by: HOSPITALIST

## 2019-12-08 PROCEDURE — 25000131 ZZH RX MED GY IP 250 OP 636 PS 637: Performed by: INTERNAL MEDICINE

## 2019-12-08 PROCEDURE — 96372 THER/PROPH/DIAG INJ SC/IM: CPT

## 2019-12-08 PROCEDURE — 25800030 ZZH RX IP 258 OP 636: Performed by: HOSPITALIST

## 2019-12-08 RX ORDER — NICOTINE POLACRILEX 4 MG
15-30 LOZENGE BUCCAL
Status: DISCONTINUED | OUTPATIENT
Start: 2019-12-08 | End: 2019-12-11 | Stop reason: HOSPADM

## 2019-12-08 RX ORDER — IBUPROFEN 200 MG
200 TABLET ORAL EVERY 6 HOURS PRN
Status: DISCONTINUED | OUTPATIENT
Start: 2019-12-08 | End: 2019-12-11 | Stop reason: HOSPADM

## 2019-12-08 RX ORDER — DEXTROSE MONOHYDRATE 25 G/50ML
25-50 INJECTION, SOLUTION INTRAVENOUS
Status: DISCONTINUED | OUTPATIENT
Start: 2019-12-08 | End: 2019-12-11 | Stop reason: HOSPADM

## 2019-12-08 RX ORDER — SODIUM CHLORIDE 9 MG/ML
INJECTION, SOLUTION INTRAVENOUS CONTINUOUS
Status: DISCONTINUED | OUTPATIENT
Start: 2019-12-08 | End: 2019-12-10

## 2019-12-08 RX ORDER — NALOXONE HYDROCHLORIDE 0.4 MG/ML
.1-.4 INJECTION, SOLUTION INTRAMUSCULAR; INTRAVENOUS; SUBCUTANEOUS
Status: DISCONTINUED | OUTPATIENT
Start: 2019-12-08 | End: 2019-12-11 | Stop reason: HOSPADM

## 2019-12-08 RX ORDER — LIDOCAINE 40 MG/G
CREAM TOPICAL
Status: DISCONTINUED | OUTPATIENT
Start: 2019-12-08 | End: 2019-12-11 | Stop reason: HOSPADM

## 2019-12-08 RX ORDER — OXYCODONE HYDROCHLORIDE 5 MG/1
5-10 TABLET ORAL
Status: DISCONTINUED | OUTPATIENT
Start: 2019-12-08 | End: 2019-12-11 | Stop reason: HOSPADM

## 2019-12-08 RX ORDER — NALOXONE HYDROCHLORIDE 0.4 MG/ML
.1-.4 INJECTION, SOLUTION INTRAMUSCULAR; INTRAVENOUS; SUBCUTANEOUS
Status: DISCONTINUED | OUTPATIENT
Start: 2019-12-08 | End: 2019-12-08

## 2019-12-08 RX ORDER — POLYETHYLENE GLYCOL 3350 17 G/17G
17 POWDER, FOR SOLUTION ORAL DAILY PRN
Status: DISCONTINUED | OUTPATIENT
Start: 2019-12-08 | End: 2019-12-11 | Stop reason: HOSPADM

## 2019-12-08 RX ORDER — ACETAMINOPHEN 325 MG/1
650 TABLET ORAL EVERY 6 HOURS PRN
Status: DISCONTINUED | OUTPATIENT
Start: 2019-12-08 | End: 2019-12-11 | Stop reason: HOSPADM

## 2019-12-08 RX ORDER — ONDANSETRON 2 MG/ML
4 INJECTION INTRAMUSCULAR; INTRAVENOUS EVERY 6 HOURS PRN
Status: DISCONTINUED | OUTPATIENT
Start: 2019-12-08 | End: 2019-12-11 | Stop reason: HOSPADM

## 2019-12-08 RX ORDER — ONDANSETRON 4 MG/1
4 TABLET, ORALLY DISINTEGRATING ORAL EVERY 6 HOURS PRN
Status: DISCONTINUED | OUTPATIENT
Start: 2019-12-08 | End: 2019-12-08

## 2019-12-08 RX ORDER — ONDANSETRON 4 MG/1
4 TABLET, ORALLY DISINTEGRATING ORAL EVERY 6 HOURS PRN
Status: DISCONTINUED | OUTPATIENT
Start: 2019-12-08 | End: 2019-12-11 | Stop reason: HOSPADM

## 2019-12-08 RX ORDER — AMPICILLIN AND SULBACTAM 2; 1 G/1; G/1
3 INJECTION, POWDER, FOR SOLUTION INTRAMUSCULAR; INTRAVENOUS EVERY 6 HOURS
Status: DISCONTINUED | OUTPATIENT
Start: 2019-12-08 | End: 2019-12-09

## 2019-12-08 RX ADMIN — INSULIN ASPART 5 UNITS: 100 INJECTION, SOLUTION INTRAVENOUS; SUBCUTANEOUS at 03:23

## 2019-12-08 RX ADMIN — SODIUM CHLORIDE: 9 INJECTION, SOLUTION INTRAVENOUS at 03:05

## 2019-12-08 RX ADMIN — AMPICILLIN SODIUM AND SULBACTAM SODIUM 3 G: 2; 1 INJECTION, POWDER, FOR SOLUTION INTRAMUSCULAR; INTRAVENOUS at 17:36

## 2019-12-08 RX ADMIN — OXYCODONE HYDROCHLORIDE 5 MG: 5 TABLET ORAL at 02:57

## 2019-12-08 RX ADMIN — AMPICILLIN SODIUM AND SULBACTAM SODIUM 3 G: 2; 1 INJECTION, POWDER, FOR SOLUTION INTRAMUSCULAR; INTRAVENOUS at 06:50

## 2019-12-08 RX ADMIN — AMPICILLIN SODIUM AND SULBACTAM SODIUM 3 G: 2; 1 INJECTION, POWDER, FOR SOLUTION INTRAMUSCULAR; INTRAVENOUS at 11:59

## 2019-12-08 RX ADMIN — INSULIN ASPART 2 UNITS: 100 INJECTION, SOLUTION INTRAVENOUS; SUBCUTANEOUS at 10:42

## 2019-12-08 RX ADMIN — SODIUM CHLORIDE: 9 INJECTION, SOLUTION INTRAVENOUS at 14:45

## 2019-12-08 RX ADMIN — INSULIN GLARGINE 16 UNITS: 100 INJECTION, SOLUTION SUBCUTANEOUS at 01:20

## 2019-12-08 RX ADMIN — ACETAMINOPHEN 650 MG: 325 TABLET, FILM COATED ORAL at 18:57

## 2019-12-08 RX ADMIN — INSULIN GLARGINE 20 UNITS: 100 INJECTION, SOLUTION SUBCUTANEOUS at 22:01

## 2019-12-08 RX ADMIN — OXYCODONE HYDROCHLORIDE 5 MG: 5 TABLET ORAL at 08:05

## 2019-12-08 RX ADMIN — INSULIN ASPART 4 UNITS: 100 INJECTION, SOLUTION INTRAVENOUS; SUBCUTANEOUS at 06:53

## 2019-12-08 RX ADMIN — OXYCODONE HYDROCHLORIDE 5 MG: 5 TABLET ORAL at 17:36

## 2019-12-08 RX ADMIN — OXYCODONE HYDROCHLORIDE 5 MG: 5 TABLET ORAL at 12:03

## 2019-12-08 RX ADMIN — ASPIRIN 325 MG: 325 TABLET, DELAYED RELEASE ORAL at 08:05

## 2019-12-08 ASSESSMENT — ACTIVITIES OF DAILY LIVING (ADL)
TRANSFERRING: 1-->ASSISTIVE EQUIPMENT
RETIRED_EATING: 0-->INDEPENDENT
SWALLOWING: 0-->SWALLOWS FOODS/LIQUIDS WITHOUT DIFFICULTY
NUMBER_OF_TIMES_PATIENT_HAS_FALLEN_WITHIN_LAST_SIX_MONTHS: 1
RETIRED_COMMUNICATION: 0-->UNDERSTANDS/COMMUNICATES WITHOUT DIFFICULTY
BATHING: 0-->INDEPENDENT
COGNITION: 0 - NO COGNITION ISSUES REPORTED
ADLS_ACUITY_SCORE: 17
ADLS_ACUITY_SCORE: 17
DRESS: 1-->ASSISTIVE EQUIPMENT
ADLS_ACUITY_SCORE: 17
ADLS_ACUITY_SCORE: 17
WHICH_OF_THE_ABOVE_FUNCTIONAL_RISKS_HAD_A_RECENT_ONSET_OR_CHANGE?: TRANSFERRING;TOILETING;BATHING;DRESSING
FALL_HISTORY_WITHIN_LAST_SIX_MONTHS: YES
ADLS_ACUITY_SCORE: 17
AMBULATION: 1-->ASSISTIVE EQUIPMENT
TOILETING: 0-->INDEPENDENT

## 2019-12-08 ASSESSMENT — ENCOUNTER SYMPTOMS
COLOR CHANGE: 0
WOUND: 1

## 2019-12-08 NOTE — PLAN OF CARE
BP max 146/77 other VSS on RA. Pain to right ankle, prn oxycodone x2 given with relief.  and 219. Non-weight bearing to RLE. NPO today until ortho sees pt. ID following. Awaiting orthos recommendations.

## 2019-12-08 NOTE — PLAN OF CARE
Patient c/o pain at surgical site. Medicated with PRN oxycodone. VS stable. BG elevated, insulin given. IV fluids infusing per orders. ABX infused. Patient remains NPO for consult.

## 2019-12-08 NOTE — H&P
[facility]    History and Physical  Hospitalist       Date of Admission:  12/7/2019    Assessment & Plan   Mikki Knox is a 43 year old female with history of diabetes mellitus on insulin therapy and recent surgery on her right ankle by Dr. Dominguez at [facility] 5 days ago who presents with postoperative fevers, chills and pain in her ankle.    Patient was admitted recently and underwent ORIF of her right ankle on 12/2.  Procedure was reportedly without complication.  Patient was discharged on 12/4.  She states that she was doing well postoperatively with pain under relatively good control until 12/7.  She states in the a.m. she started to have increased pain of her right ankle.  She also started to notice subjective fevers.  She did take her temperature at home and it was 100.3 and then 100.5. She denies any worsening leg swelling, shortness of breath, chest pain, palpitations.  She try to take her pain medication as prescribed but this did not per symptoms.  She presented to the ED for further evaluation    In the ED, patient tachycardic and febrile to 100.5.  CBC was within normal limits.  BMP notable for glucose of 408.  Lactic acid was normal.  Orthopedics was consulted in the ED and recommended admission and they will see the patient in the a.m.  A bivalve of the cast was performed in the ED and wound was noted to be healing well with no significant edema or streaking.  She was given Unasyn and IV fluids.    #Postoperative fever: Concern for surgical site infection.  Patient without unilateral swelling, chest pain or shortness of breath making PE less likely.  She is also been on full dose aspirin.  Patient febrile and tachycardic indicating sepsis.  Other vital signs within normal limits.  Lactic acid within normal limits.  Concern for operative infection.  Wound did appear clean without active drainage.  Orthopedics did request admission and IV antibiotics which were administered in the ED.  -We will  continue IV Unasyn  -Follow-up on blood cultures  -Orthopedics consulted and will see the patient in the a.m.  -Infectious disease consulted for evaluation of surgical site infection.  -Continue home pain meds including acetaminophen, ibuprofen and oxycodone  -Continue full dose aspirin  -Will maintain NPO until ortho sees patient later this AM    #DMII: Patient glucose elevated in the ED to over 400.  We will continue home glargine.  Sliding scale insulin available.  Also hypoglycemia protocol in place.    DVT Prophylaxis: Aspirin  Code Status: Full Code  Expected discharge: 2 - 3 days, recommended to prior living arrangement once SIRS/Sepsis treated.    Darek Jackson MD    Primary Care Physician   Physician No Ref-Primary    Chief Complaint   Fevers, increased Right ankle pain    History is obtained from the patient, patient's chart and also discussed with emergency room provider.    History of Present Illness   Mikki Knox is a 43 year old female with history of diabetes mellitus on insulin therapy and recent surgery on her right ankle by Dr. Dominguez at [facility] 5 days ago who presents with postoperative fevers, chills and pain in her ankle.    Patient was admitted recently and underwent ORIF of her right ankle on 12/2.  Procedure was reportedly without complication.  Patient was discharged on 12/4.  She states that she was doing well postoperatively with pain under relatively good control until 12/7.  She states in the a.m. she started to have increased pain of her right ankle.  She also started to notice subjective fevers.  She did take her temperature at home and it was 100.3 and then 100.5.  She also started to feel chills.  She denies any worsening leg swelling, shortness of breath, chest pain, palpitations.  She try to take her pain medication as prescribed but this did not per symptoms.  She presented to the ED for further evaluation    In the ED, patient tachycardic and febrile to 100.5.  CBC was within normal  limits.  BMP notable for glucose of 408.  Lactic acid was normal.  Orthopedics was consulted in the ED and recommended admission and they will see the patient in the a.m.  A bivalve of the cast was performed in the ED and wound was noted to be healing well with no significant edema or streaking.  She was given Unasyn and IV fluids.    Past Medical History    I have reviewed this patient's medical history and updated it with pertinent information if needed.   Past Medical History:   Diagnosis Date     Diabetes mellitus (H)      Hyperlipidemia      Ovarian cyst      Tubo-ovarian abscess        Past Surgical History   I have reviewed this patient's surgical history and updated it with pertinent information if needed.  Past Surgical History:   Procedure Laterality Date     APPENDECTOMY       OPEN REDUCTION INTERNAL FIXATION ANKLE Right 12/2/2019    Procedure: Open reduction and internal fixation with a 1/3 tubular plate interfragmentary screw and a TightRope syndesmosis fixation;  Surgeon: Rinku Dominguez MD;  Location:  OR       Prior to Admission Medications   Prior to Admission Medications   Prescriptions Last Dose Informant Patient Reported? Taking?   acetaminophen (TYLENOL) 325 MG tablet   No No   Sig: Take 2 tablets (650 mg) by mouth every 6 hours as needed for pain   acetaminophen (TYLENOL) 325 MG tablet   No No   Sig: Take 3 tablets (975 mg) by mouth every 8 hours   aspirin (ASA) 325 MG EC tablet   No No   Sig: Take 1 tablet (325 mg) by mouth daily   ibuprofen (ADVIL/MOTRIN) 200 MG tablet   Yes No   Sig: Take 200 mg by mouth every 6 hours as needed for mild pain   insulin glargine (LANTUS PEN) 100 UNIT/ML pen   No No   Sig: Inject 16 Units Subcutaneous At Bedtime   ondansetron (ZOFRAN-ODT) 4 MG ODT tab   No No   Sig: Take 1 tablet (4 mg) by mouth every 6 hours as needed for nausea or vomiting   order for DME   No No   Sig: Equipment being ordered: Walker Wheels (), Walker () and Crutches  ()  Treatment Diagnosis: difficulty walking   order for DME   No No   Sig: Equipment being ordered: Bath Transfer Bench ()  Treatment Diagnosis: Impaired transfers/mobility   oxyCODONE (ROXICODONE) 5 MG tablet   No No   Sig: Take 1-2 tablets (5-10 mg) by mouth every 3 hours as needed for severe pain   polyethylene glycol (MIRALAX/GLYCOLAX) packet   No No   Sig: Take 17 g by mouth daily as needed for constipation      Facility-Administered Medications: None     Allergies   No Known Allergies    Social History   I have reviewed this patient's social history and updated it with pertinent information if needed. Mikki Knox  reports that she has never smoked. She has never used smokeless tobacco. She reports current alcohol use. She reports that she does not use drugs.    Family History   I have reviewed this patient's family history and updated it with pertinent information if needed.   Family History   Problem Relation Age of Onset     Diabetes Mother      Hypertension Mother      Breast Cancer Mother      Diabetes Paternal Grandmother      Heart Disease Paternal Grandmother      Heart Disease Maternal Aunt        Review of Systems   The 10 point Review of Systems is negative other than noted in the HPI or here.     Physical Exam   Temp: 99.3  F (37.4  C) Temp src: Oral BP: (!) 142/85 Pulse: 97 Heart Rate: 118 Resp: 20 SpO2: 98 %      Vital Signs with Ranges  Temp:  [99.3  F (37.4  C)-100.5  F (38.1  C)] 99.3  F (37.4  C)  Pulse:  [] 97  Heart Rate:  [118] 118  Resp:  [20] 20  BP: (142-165)/(83-91) 142/85  SpO2:  [95 %-100 %] 98 %  0 lbs 0 oz    Constitutional: Nontoxic, NAD  HEENT: Sclerae moist, normocephalic.  No elevation of JVD  Respiratory: Normal work of breathing.  No wheezes or crackles.  Clear bilaterally  Cardiovascular: Tachycardic but regular.  No murmur appreciated  GI: Bowel sounds present, nontender nondistended  Lymph/Hematologic: No bruising noted  Skin: No edema  bilaterally  Musculoskeletal: Nl Tone.  Right ankle remains in cast without any active drainage noted.  Neurologic: Cranial  nerves II through XII intact, moves all extremities.  Sensation intact distally.  Psychiatric: Appropriate    Data   Data reviewed today:  I personally reviewed   Recent Labs   Lab 12/07/19  2248 12/04/19  0628 12/03/19  0612 12/02/19  1807  12/02/19  1228   WBC 9.0 9.2  --  11.7*  --   --    HGB 14.5 14.3 13.7 14.8   < >  --    MCV 85 87  --  88  --   --     299  --  291  --   --    *  --   --   --   --  136   POTASSIUM 3.8  --   --   --   --  3.9   CHLORIDE 98  --   --   --   --  105   CO2 25  --   --   --   --  23   BUN 14  --   --   --   --  9   CR 0.45*  --   --   --   --  0.51*   ANIONGAP 9  --   --   --   --  8   LUCERO 8.9  --   --   --   --  8.6   * 229* 246*  --   --  331*    < > = values in this interval not displayed.       No results found for this or any previous visit (from the past 24 hour(s)).

## 2019-12-08 NOTE — ED NOTES
[facility]  ED Nurse Handoff Report    Mikki Knox is a 43 year old female   ED Chief complaint: Post-op Problem  . ED Diagnosis:   Final diagnoses:   Post op infection     Allergies: No Known Allergies    Code Status: not on file  Activity level - Baseline/Home:  Independent. Activity Level - Current:   independent with crutches, non weight bearing. Lift room needed: No. Bariatric: No   Needed: No   Isolation: No. Infection: Not Applicable.     Vital Signs:   Vitals:    12/07/19 2236 12/07/19 2300 12/07/19 2315 12/07/19 2330   BP: (!) 165/91 (!) 153/91 (!) 145/87 (!) 155/89   Pulse: 118 104 105 102   Resp: 20      Temp: 100.5  F (38.1  C)      TempSrc: Temporal      SpO2: 100% 95% 100% 97%       Cardiac Rhythm:  ,      Pain level:    Patient confused: No. Patient Falls Risk: Yes.   Elimination Status: has not yet voided need UA   Patient Report - Initial Complaint: Post-op infection. Focused Assessment: Recent ankle repair after falling on ice and breaking/dislocating ankle.  Pain had been ok after surgery, but tonight pain spiked and felt like her leg was burning.  Fever/chills. CMS intact.  Tests Performed:   Labs Ordered and Resulted from Time of ED Arrival Up to the Time of Departure from the ED   BASIC METABOLIC PANEL - Abnormal; Notable for the following components:       Result Value    Sodium 132 (*)     Glucose 408 (*)     Creatinine 0.45 (*)     All other components within normal limits   CBC WITH PLATELETS DIFFERENTIAL   LACTIC ACID WHOLE BLOOD   BLOOD GAS VENOUS AND OXYHGB   KETONE BETA-HYDROXYBUTYRATE QUANTITATIVE   ROUTINE UA WITH MICROSCOPIC REFLEX TO CULTURE   MAY SALINE LOCK IV   .  No orders to display     Treatments provided: See MD GABE removed cast.  Broken cast re-ace wrapped for the time being per MD  Family Comments: SO at bedside  OBS brochure/video discussed/provided to patient:  Yes  ED Medications:   Medications   ampicillin-sulbactam (UNASYN) 3 g vial to attach  to  mL bag (3 g Intravenous New Bag 12/7/19 4682)   0.9% sodium chloride BOLUS (1,000 mLs Intravenous New Bag 12/7/19 2258)   acetaminophen (TYLENOL) tablet 1,000 mg (1,000 mg Oral Given 12/7/19 2255)   ibuprofen (ADVIL/MOTRIN) tablet 600 mg (600 mg Oral Given 12/7/19 2255)   HYDROmorphone (PF) (DILAUDID) injection 0.5 mg (0.5 mg Intravenous Given 12/7/19 3401)     Drips infusing:  No  For the majority of the shift, the patient's behavior Green. Interventions performed were rounding.     Severe Sepsis OR Septic Shock Diagnosis Present: No      ED Nurse Name/Phone Number: Corina Bueno RN,   11:59 PM      RECEIVING UNIT ED HANDOFF REVIEW    Above ED Nurse Handoff Report was reviewed: Yes  Reviewed by: Wilma Andrade RN on December 8, 2019 at 1:28 AM

## 2019-12-08 NOTE — ED TRIAGE NOTES
Orthopedic surgery performed on Rt ankle Monday. Rt ankle pain and chills this evening.  Temp of 100.6 at home.  Took acetaminophen at 2130.

## 2019-12-08 NOTE — ED PROVIDER NOTES
History     Chief Complaint:  Post-op Problem    FRANCOISE Knox is a 43 year old female with history of diabetes who presents to the emergency department today with post-op problem. The patient had a Surgery on her right ankle by Dr. Dominguez at [facility] 5 days ago. This evening she started to have fever, chills, and burning pain in the ankle. She is febrile at 100.5 in the ED. Took Thornton at 2130 and aspirin. She denies cough, cold, urinary symptoms.     Allergies:  No Known Drug Allergies     Medications:    Aspirin   Lantus   Zofran  Roxicodone   Miralax    Past Medical History:    Diabetes mellitus   Ankle fracture  Hyperlipidemia   Ovarian cyst  Tubo-ovarian abscess     Past Surgical History:    Appendectomy  ORIF ankle      Family History:    Mother - Diabetes   Hypertension   Breast cancer  Grandmother - Diabetes   Heart disease   Aunt - Heart disease      Social History:  The patient was accompanied to the ED by .  Smoking Status: Never  Smokeless Tobacco: Never  Alcohol Use: Yes   Marital Status:      Review of Systems   Constitutional: Positive for chills and fever.   HENT: Negative for congestion.    Respiratory: Negative for cough.    Cardiovascular: Negative for chest pain.   Genitourinary: Negative for difficulty urinating and dysuria.   Musculoskeletal: Positive for arthralgias (right ankle pain and burning).   Skin: Positive for wound. Negative for color change.   All other systems reviewed and are negative.      Physical Exam     Patient Vitals for the past 24 hrs:   BP Temp Temp src Pulse Heart Rate Resp SpO2   12/08/19 0043 -- 99.3  F (37.4  C) Oral -- -- -- --   12/08/19 0030 (!) 142/85 -- -- 97 -- -- 98 %   12/08/19 0015 (!) 154/83 -- -- 104 -- -- 95 %   12/07/19 2330 (!) 155/89 -- -- 102 -- -- 97 %   12/07/19 2315 (!) 145/87 -- -- 105 -- -- 100 %   12/07/19 2300 (!) 153/91 -- -- 104 -- -- 95 %   12/07/19 2236 (!) 165/91 100.5  F (38.1  C) Temporal 118 118 20 100 %        Physical  Exam  General: Alert, Moderate discomfort, well kept, obese  HENT:  Normal voice, No lymphadenopathy  Eyes:  The pupils are equal, round, and reactive to light, Conjunctiva normal, No scleral icterus   Neck:  Normal range of motion  CV:  Normal Pulses  Resp:  Non-labored, No cough  MS:  Normal muscular tone, moves all extremities, cast was bivalved and wound was evaluated.  Wound appears to be intact without drainage.  There is no significant erythema or proximal streaking.  Skin:  No rash or acute skin lesions noted  Neuro: Speech is normal and fluent  Psych:  Awake. Alert.  Normal affect.  Appropriate interactions. Good eye contact  Emergency Department Course   Laboratory:  Laboratory findings were communicated with the patient and family who voiced understanding of the findings.  Lactic Acid: 1.4   BMP: 132 Na, 408 Glucose o/w WNL (Creatinine 0.45)   CBC: AWNL (WBC 9.0, HGB 14.5, )   Ketone beta-hydroxybutyrate: 0.1   Blood gas venous: pH Venous 7.40, PCO2 Venous 47, PO2 Venous 32, Bicarbonate 29, Base Deficit 3.2, FIO2 Room air        Interventions:  2255: Tylenol 1,000 mg PO   2255: Advil 600 mg PO   2256: 0.9% NaCl 1L IV Bolus   2325: Dilaudid 0.5mg IV    2341: Unasyn 3 g IV      Emergency Department Course:  Nursing notes and vitals reviewed.  2240: I performed an exam of the patient as documented above.   IV was inserted and blood was drawn for laboratory testing, results above.   2255: I spoke with Dr. Shafer of the Orthopedics service regarding patient's presentation, findings, and plan of care.   2340: I performed a bivalve of the cast. The wound seems to be healing well, no significant edema or proximal streaking.    0034: I spoke with Dr. Shafer of the Orthopedics service regarding patient's presentation, findings, and plan of care.   0037: Findings and plan explained to the Patient who consents to admission. Discussed the patient with Dr. Jackson who will admit the patient to a Med/Surg bed for  further monitoring, evaluation, and treatment. I personally reviewed the laboratory results with the Patient and answered all related questions prior to admission.      Impression & Plan    Medical Decision Making:  Mikki Knox is a 43 year old female who presents today for evaluation of postoperative complication.  She developed a fever and chills as well as increased pain in her right ankle.  She had repair of bimalleolar fracture 5 days ago.  She initially presented with concerns for sepsis.  Her laboratory studies however did not confirm this.  She was however febrile and tachycardic.  This was concerning for postoperative infection.  I did evaluate the postoperative wound after bivalving the cast and there was no obvious drainage or concern for emergent need for washout.  I discussed the case with the on-call surgeon.  I did start antibiotics as above.  Patient felt improved after initiation of medications above.  Given her signs and symptoms this is still concerning for postoperative infection therefore patient will be admitted to the medical surgical unit for continued antibiotics and orthopedic evaluation.      Diagnosis:    ICD-10-CM    1. Post op infection T81.40XA Blood gas venous and oxyhgb     Ketone Beta-Hydroxybutyrate Quantitative       Disposition:  Admitted to Med/Surg    Scribe Disclosure:  Conchita GLEZ MD, am serving as a scribe at 10:56 PM on 12/7/2019 to document services personally performed by Ezio Medina APRN based on my observations and the provider's statements to me.    12/7/2019   [facility] EMERGENCY DEPARTMENT       Ezio Medina APRN CNP  12/08/19 0136

## 2019-12-08 NOTE — PROGRESS NOTES
[facility]  Hospitalist Progress Note    Denny Chamberlain MD 12/08/2019  Text Page      Reason for Stay (Diagnosis): Ankle pain and fever         Assessment and Plan:      Summary of Stay: Mikki Knox is a 43 year old female with history of diabetes mellitus on insulin therapy and recent surgery on her right ankle by Dr. Dominguez at [facility] 5 days ago who presents with postoperative fevers, chills and pain in her ankle.     Patient was admitted recently and underwent ORIF of her right ankle on 12/2.  Procedure was reportedly without complication.  Patient was discharged on 12/4.  She states that she was doing well postoperatively with pain under relatively good control until 12/7.  She states in the a.m. she started to have increased pain of her right ankle.  She also started to notice subjective fevers.  She did take her temperature at home and it was 100.3 and then 100.5. She denies any worsening leg swelling, shortness of breath, chest pain, palpitations.  She try to take her pain medication as prescribed but this did not per symptoms.  She presented to the ED for further evaluation     In the ED, patient tachycardic and febrile to 100.5.  CBC was within normal limits.  BMP notable for glucose of 408.  Lactic acid was normal.  Orthopedics was consulted in the ED and recommended admission and they will see the patient in the a.m.   Her cast was cut open in the ED and wound was noted to be healing well with no significant edema or streaking.  She was given Unasyn and IV fluids.     #Postoperative fever: Concern for surgical site infection.  Patient without unilateral swelling, chest pain or shortness of breath making PE less likely.  She is also been on full dose aspirin.  Patient febrile and tachycardic indicating sepsis.  Other vital signs within normal limits.  Lactic acid within normal limits.  Concern for operative infection.  Wound did appear clean without erythema or drainage.  Orthopedics did  request admission and IV antibiotics which were administered in the ED.  -We will continue IV Unasyn  -Blood cultures are unavailable  -Orthopedics consulted   -Infectious disease consulted for evaluation of surgical site infection.  -Continue home pain meds including acetaminophen, ibuprofen and oxycodone  -Continue full dose aspirin  -Will maintain NPO until ortho sees patient      #DMII, uncontrolled:   -Patient glucose elevated in the ED to over 400.  This is atypical for her as she says prior to her injury her blood sugars were usually around the 140s.  -- Despite resuming her home glargine dose of 16 units at night and treating with sliding scale insulin her blood sugars are still quite elevated and she has been n.p.o.  --We will increase Lantus and increase dosing of sliding scale insulin.     DVT Prophylaxis: Aspirin  Code Status: Full Code  Lines: None  Washburn: None  Disposition Plan   Expected discharge in 2 days to prior living arrangement once treatment plan determined with the help of orthopedic surgery and infectious disease team.     Entered: Denny Chamberlain 12/08/2019, 12:51 PM     Incidental Findings/ Discharge Issues:  Discussed with the patient and her           Interval History (Subjective):      Some pain in the right ankle.  Otherwise she feels well.                  Physical Exam:      Last Vital Signs:  BP (!) 146/77 (BP Location: Left arm)   Pulse 97   Temp 97.1  F (36.2  C) (Oral)   Resp 20   SpO2 97%   Next 100.5 last night, now afebrile.    Vital signs reviewed  General:  Alert, calm, NAD  CV: regular rate and rhythm, no murmurs or rubs  Lungs:  Clear to ascultation bilaterally, normal respiratory effort  HEENT:  Pupil round, equal, conjuctivae, sclerae and lids normal, neck is supple  Abdomen:  Soft, nontender, nondistended, no masses, normal bowel sounds  Extremities: Right ankle wrapped.  Per report no significant erythema in the surgical site.  Neuro: normal strength  and sensation in all 4 extremities, cranial nerves grossly intact  Psychiatric:  Mood and affect within normal limits             Medications:      All current medications were reviewed with changes reflected in problem list.         Data:      All new lab and imaging data was reviewed.   Labs:

## 2019-12-08 NOTE — ED NOTES
Provider cut cast off.  Per Dukes request, the 2 broken cast pieces were ace wrapped loosely for some support.

## 2019-12-08 NOTE — CONSULTS
Consult Date:  2019      CHIEF COMPLAINT:  Right ankle pain.      HISTORY OF PRESENT ILLNESS:  This 43-year-old woman was admitted through the emergency room last night for the concern of an infection in her right ankle, status post open reduction internal fixation for ankle fracture.      The patient currently is feeling much better.  She is smiling in bed, tells me she has minimal pain.      PHYSICAL EXAMINATION:  I examined her wounds.  Her splint is a bivalve.  Wounds are healing very nicely with no evidence of infection.  She has been afebrile since she has been in the hospital.        Her white count is 9.0.  All of her other labs remained acceptable other than her elevated glucose due to her diabetes.      DISCUSSION/SUMMARY:  I had a long and pleasant discussion with the patient.  I told her I certainly do not see any signs of infection.  I suspect that she simply was hanging the ankle down and has swelling and pain on that basis.  I recommended that we watch her one more night.  She was febrile when she came in.  If she remains afebrile overnight on antibiotics again, then we will fix the cast in the morning and her back out.  She is very comfortable with this plan.         TIFFANIE CORDERO MD             D: 2019   T: 2019   MT: KATIE      Name:     MALIKA THOMPSON   MRN:      [id number]        Account:       [id number]   :      1975           Consult Date:  2019      Document: [id number]       cc: Tiffanie Cordero MD

## 2019-12-08 NOTE — PHARMACY-ADMISSION MEDICATION HISTORY
Admission medication history interview status for this patient is complete. See [facility] admission navigator for allergy information, prior to admission medications and immunization status.     Medication history interview source(s):Patient  Medication history resources (including written lists, pill bottles, clinic record):None    Changes made to PTA medication list:  Added: none  Deleted: ibuprofen  Changed: none    Actions taken by pharmacist (provider contacted, etc):None     Additional medication history information:None    Medication reconciliation/reorder completed by provider prior to medication history? Yes    Do you take OTC medications (eg tylenol, ibuprofen, fish oil, eye/ear drops, etc)? Y(Y/N)    For patients on insulin therapy: Y (Y/N)  Lantus/levemir/NPH/Mix 70/30 dose:   (Y) (see Med list for doses)   Sliding scale Novolog N  If Yes, do you have a baseline novolog pre-meal dose:  units with meals  Patients eat three meals a day:   Y    How many episodes of hypoglycemia do you have per week: ___none____  How many missed doses do you have per week: __none____  How many times do you check your blood glucose per day: _____4 times (meals and bedtime)__  Do you have a Continuous glucose monitor (CGM)   N (remind pt that not approved for hospital use)   Any Barriers to therapy - Be specific :  cost of medications, comfortable with giving injections (if applicable), comfortable and confident with current diabetes regimen: Y/N ________N______    Time spent in this activity: 10 min    Prior to Admission medications    Medication Sig Last Dose Taking? Auth Provider   acetaminophen (TYLENOL) 325 MG tablet Take 2 tablets (650 mg) by mouth every 6 hours as needed for pain 12/7/2019 at Unknown time Yes Chana Sierra PA-C   acetaminophen (TYLENOL) 325 MG tablet Take 3 tablets (975 mg) by mouth every 8 hours  at prn Yes Chana Sierra PA-C   aspirin (ASA) 325 MG EC tablet Take 1 tablet (325 mg) by mouth  daily 12/7/2019 at Unknown time Yes Chana Sierra PA-C   insulin glargine (LANTUS PEN) 100 UNIT/ML pen Inject 16 Units Subcutaneous At Bedtime 12/7/2019 at inpt Yes Nayeli Goetz DO   ondansetron (ZOFRAN-ODT) 4 MG ODT tab Take 1 tablet (4 mg) by mouth every 6 hours as needed for nausea or vomiting  at prn Yes Chana Sierra PA-C   oxyCODONE (ROXICODONE) 5 MG tablet Take 1-2 tablets (5-10 mg) by mouth every 3 hours as needed for severe pain  at prn Yes Chana Sierra PA-C   polyethylene glycol (MIRALAX/GLYCOLAX) packet Take 17 g by mouth daily as needed for constipation  at prn Yes Chana Sierra PA-C   order for DME Equipment being ordered: Bath Transfer Bench ()  Treatment Diagnosis: Impaired transfers/mobility   Nayeli Goetz DO   order for DME Equipment being ordered: Walker Wheels (), Walker () and Crutches ()  Treatment Diagnosis: difficulty walking   Rinku Dominguez MD

## 2019-12-08 NOTE — CONSULTS
[facility]    Infectious Disease Consultation     Date of Admission:  12/7/2019  Date of Consult (When I saw the patient): 12/08/19    Assessment & Plan   Mikki Knox is a 43 year old female who was admitted on 12/7/2019.     Impression:  1. 43 y.o female who recently was seen by ortho for right ankle fracture after slipping on ice.   2. This was repaired with ORIF 6 days ago.   3. Now presenting with fevers, ankle pain.   4. On exam the ankle does not appear to be erythematous, incision sutures intact, no increased drainage, but also no other symptoms to explain the fever.   5. Started on unasyn.     Recommendations:   Continue on unasyn  Follow up on the pending cultures, symptoms   Will request orthopedic eval of the ankle as well.     Ani Mason MD    Reason for Consult   Reason for consult: I was asked by dr. Jackson  to evaluate this patient for possible postoperative infection. .    Primary Care Physician   Physician No Ref-Primary    Chief Complaint   Fevers     History is obtained from the patient and medical records    History of Present Illness   Mikki Knox is a 43 year old female with diabetes mellitus on insulin therapy and recent surgery on her right ankle ORIF by Dr. Dominguez at [facility] 5 days ago who presents with postoperative fevers, chills and pain in her ankle.    Past Medical History   I have reviewed this patient's medical history and updated it with pertinent information if needed.   Past Medical History:   Diagnosis Date     Diabetes mellitus (H)      Hyperlipidemia      Ovarian cyst      Tubo-ovarian abscess        Past Surgical History   I have reviewed this patient's surgical history and updated it with pertinent information if needed.  Past Surgical History:   Procedure Laterality Date     APPENDECTOMY       OPEN REDUCTION INTERNAL FIXATION ANKLE Right 12/2/2019    Procedure: Open reduction and internal fixation with a 1/3 tubular plate interfragmentary screw and a TightRope  syndesmosis fixation;  Surgeon: Rinku Dominguez MD;  Location:  OR       Prior to Admission Medications   Prior to Admission Medications   Prescriptions Last Dose Informant Patient Reported? Taking?   acetaminophen (TYLENOL) 325 MG tablet   No No   Sig: Take 2 tablets (650 mg) by mouth every 6 hours as needed for pain   acetaminophen (TYLENOL) 325 MG tablet   No No   Sig: Take 3 tablets (975 mg) by mouth every 8 hours   aspirin (ASA) 325 MG EC tablet   No No   Sig: Take 1 tablet (325 mg) by mouth daily   ibuprofen (ADVIL/MOTRIN) 200 MG tablet   Yes No   Sig: Take 200 mg by mouth every 6 hours as needed for mild pain   insulin glargine (LANTUS PEN) 100 UNIT/ML pen   No No   Sig: Inject 16 Units Subcutaneous At Bedtime   ondansetron (ZOFRAN-ODT) 4 MG ODT tab   No No   Sig: Take 1 tablet (4 mg) by mouth every 6 hours as needed for nausea or vomiting   order for DME   No No   Sig: Equipment being ordered: Walker Wheels (), Walker () and Crutches ()  Treatment Diagnosis: difficulty walking   order for DME   No No   Sig: Equipment being ordered: Bath Transfer Bench ()  Treatment Diagnosis: Impaired transfers/mobility   oxyCODONE (ROXICODONE) 5 MG tablet   No No   Sig: Take 1-2 tablets (5-10 mg) by mouth every 3 hours as needed for severe pain   polyethylene glycol (MIRALAX/GLYCOLAX) packet   No No   Sig: Take 17 g by mouth daily as needed for constipation      Facility-Administered Medications: None     Allergies   No Known Allergies    Immunization History   Immunization History   Administered Date(s) Administered     Influenza (IIV3) PF 10/31/2012     Tdap (Adacel,Boostrix) 02/04/2010       Social History   I have reviewed this patient's social history and updated it with pertinent information if needed. Mikki Knox  reports that she has never smoked. She has never used smokeless tobacco. She reports current alcohol use. She reports that she does not use drugs.    Family History   I have  reviewed this patient's family history and updated it with pertinent information if needed.   Family History   Problem Relation Age of Onset     Diabetes Mother      Hypertension Mother      Breast Cancer Mother      Diabetes Paternal Grandmother      Heart Disease Paternal Grandmother      Heart Disease Maternal Aunt        Review of Systems   The 10 point Review of Systems is negative other than noted in the HPI or here.     Physical Exam   Temp: 97.1  F (36.2  C) Temp src: Oral BP: (!) 146/77 Pulse: 97 Heart Rate: 68 Resp: 20 SpO2: 97 % O2 Device: None (Room air)    Vital Signs with Ranges  Temp:  [97.1  F (36.2  C)-100.5  F (38.1  C)] 97.1  F (36.2  C)  Pulse:  [] 97  Heart Rate:  [] 68  Resp:  [18-20] 20  BP: (142-165)/(77-91) 146/77  SpO2:  [95 %-100 %] 97 %  0 lbs 0 oz  There is no height or weight on file to calculate BMI.    GENERAL APPEARANCE:  alert and no distress  EYES: Eyes grossly normal to inspection, PERRL and conjunctivae and sclerae normal  HENT: ear canals and TM's normal and nose and mouth without ulcers or lesions  NECK: no adenopathy, no asymmetry, masses, or scars and thyroid normal to palpation  RESP: lungs clear to auscultation - no rales, rhonchi or wheezes  CV: regular rates and rhythm, normal S1 S2, no S3 or S4 and no murmur, click or rub  LYMPHATICS: normal ant/post cervical and supraclavicular nodes  ABDOMEN: soft, nontender, without hepatosplenomegaly or masses and bowel sounds normal  MS: extremities normal- no gross deformities noted  SKIN: no suspicious lesions or rashes      Data   Lab Results   Component Value Date    WBC 9.0 12/07/2019    HGB 14.5 12/07/2019    HCT 42.7 12/07/2019     12/07/2019     (L) 12/07/2019    POTASSIUM 3.8 12/07/2019    CHLORIDE 98 12/07/2019    CO2 25 12/07/2019    BUN 14 12/07/2019    CR 0.45 (L) 12/07/2019     (H) 12/07/2019    AST 59 (H) 02/11/2017    ALT 92 (H) 02/11/2017    ALKPHOS 66 02/11/2017    BILITOTAL 0.3  02/11/2017     No results for input(s): CULT in the last 168 hours.  Recent Labs   Lab Test 02/12/17  0115 12/07/16  1730   CULT No Beta Streptococcus isolated No Beta Streptococcus isolated       Amount of time performed on this consult: 45 minutes. This includes face to face assessment and care coordination with the primary team.

## 2019-12-09 LAB
ANION GAP SERPL CALCULATED.3IONS-SCNC: 8 MMOL/L (ref 3–14)
BUN SERPL-MCNC: 8 MG/DL (ref 7–30)
CALCIUM SERPL-MCNC: 8.1 MG/DL (ref 8.5–10.1)
CHLORIDE SERPL-SCNC: 104 MMOL/L (ref 94–109)
CO2 SERPL-SCNC: 25 MMOL/L (ref 20–32)
CREAT SERPL-MCNC: 0.45 MG/DL (ref 0.52–1.04)
ERYTHROCYTE [DISTWIDTH] IN BLOOD BY AUTOMATED COUNT: 12 % (ref 10–15)
GFR SERPL CREATININE-BSD FRML MDRD: >90 ML/MIN/{1.73_M2}
GLUCOSE BLDC GLUCOMTR-MCNC: 185 MG/DL (ref 70–99)
GLUCOSE BLDC GLUCOMTR-MCNC: 231 MG/DL (ref 70–99)
GLUCOSE BLDC GLUCOMTR-MCNC: 234 MG/DL (ref 70–99)
GLUCOSE BLDC GLUCOMTR-MCNC: 242 MG/DL (ref 70–99)
GLUCOSE BLDC GLUCOMTR-MCNC: 242 MG/DL (ref 70–99)
GLUCOSE BLDC GLUCOMTR-MCNC: 277 MG/DL (ref 70–99)
GLUCOSE BLDC GLUCOMTR-MCNC: 294 MG/DL (ref 70–99)
GLUCOSE SERPL-MCNC: 225 MG/DL (ref 70–99)
HCT VFR BLD AUTO: 39.6 % (ref 35–47)
HGB BLD-MCNC: 13 G/DL (ref 11.7–15.7)
MCH RBC QN AUTO: 28.7 PG (ref 26.5–33)
MCHC RBC AUTO-ENTMCNC: 32.8 G/DL (ref 31.5–36.5)
MCV RBC AUTO: 87 FL (ref 78–100)
PLATELET # BLD AUTO: 321 10E9/L (ref 150–450)
POTASSIUM SERPL-SCNC: 3.5 MMOL/L (ref 3.4–5.3)
RBC # BLD AUTO: 4.53 10E12/L (ref 3.8–5.2)
SODIUM SERPL-SCNC: 137 MMOL/L (ref 133–144)
WBC # BLD AUTO: 7 10E9/L (ref 4–11)

## 2019-12-09 PROCEDURE — 99233 SBSQ HOSP IP/OBS HIGH 50: CPT | Performed by: INTERNAL MEDICINE

## 2019-12-09 PROCEDURE — 80048 BASIC METABOLIC PNL TOTAL CA: CPT | Performed by: HOSPITALIST

## 2019-12-09 PROCEDURE — 36415 COLL VENOUS BLD VENIPUNCTURE: CPT | Performed by: INTERNAL MEDICINE

## 2019-12-09 PROCEDURE — 87040 BLOOD CULTURE FOR BACTERIA: CPT | Performed by: INTERNAL MEDICINE

## 2019-12-09 PROCEDURE — 12000000 ZZH R&B MED SURG/OB

## 2019-12-09 PROCEDURE — 36415 COLL VENOUS BLD VENIPUNCTURE: CPT | Performed by: HOSPITALIST

## 2019-12-09 PROCEDURE — 25000132 ZZH RX MED GY IP 250 OP 250 PS 637: Performed by: HOSPITALIST

## 2019-12-09 PROCEDURE — 25000131 ZZH RX MED GY IP 250 OP 636 PS 637: Performed by: INTERNAL MEDICINE

## 2019-12-09 PROCEDURE — 00000146 ZZHCL STATISTIC GLUCOSE BY METER IP

## 2019-12-09 PROCEDURE — 85027 COMPLETE CBC AUTOMATED: CPT | Performed by: HOSPITALIST

## 2019-12-09 PROCEDURE — 25800030 ZZH RX IP 258 OP 636: Performed by: HOSPITALIST

## 2019-12-09 PROCEDURE — 25000128 H RX IP 250 OP 636: Performed by: HOSPITALIST

## 2019-12-09 RX ORDER — NICOTINE POLACRILEX 4 MG
15-30 LOZENGE BUCCAL
Status: DISCONTINUED | OUTPATIENT
Start: 2019-12-09 | End: 2019-12-10

## 2019-12-09 RX ORDER — DEXTROSE MONOHYDRATE 25 G/50ML
25-50 INJECTION, SOLUTION INTRAVENOUS
Status: DISCONTINUED | OUTPATIENT
Start: 2019-12-09 | End: 2019-12-10

## 2019-12-09 RX ADMIN — OXYCODONE HYDROCHLORIDE 5 MG: 5 TABLET ORAL at 11:13

## 2019-12-09 RX ADMIN — OXYCODONE HYDROCHLORIDE 5 MG: 5 TABLET ORAL at 16:35

## 2019-12-09 RX ADMIN — INSULIN ASPART 1 UNITS: 100 INJECTION, SOLUTION INTRAVENOUS; SUBCUTANEOUS at 08:53

## 2019-12-09 RX ADMIN — ACETAMINOPHEN 650 MG: 325 TABLET, FILM COATED ORAL at 21:22

## 2019-12-09 RX ADMIN — SODIUM CHLORIDE: 9 INJECTION, SOLUTION INTRAVENOUS at 02:20

## 2019-12-09 RX ADMIN — OXYCODONE HYDROCHLORIDE 5 MG: 5 TABLET ORAL at 00:26

## 2019-12-09 RX ADMIN — SODIUM CHLORIDE: 9 INJECTION, SOLUTION INTRAVENOUS at 14:28

## 2019-12-09 RX ADMIN — OXYCODONE HYDROCHLORIDE 5 MG: 5 TABLET ORAL at 06:07

## 2019-12-09 RX ADMIN — AMPICILLIN SODIUM AND SULBACTAM SODIUM 3 G: 2; 1 INJECTION, POWDER, FOR SOLUTION INTRAMUSCULAR; INTRAVENOUS at 06:07

## 2019-12-09 RX ADMIN — OXYCODONE HYDROCHLORIDE 5 MG: 5 TABLET ORAL at 21:22

## 2019-12-09 RX ADMIN — INSULIN GLARGINE 20 UNITS: 100 INJECTION, SOLUTION SUBCUTANEOUS at 21:22

## 2019-12-09 RX ADMIN — AMPICILLIN SODIUM AND SULBACTAM SODIUM 3 G: 2; 1 INJECTION, POWDER, FOR SOLUTION INTRAMUSCULAR; INTRAVENOUS at 00:20

## 2019-12-09 RX ADMIN — INSULIN ASPART 3 UNITS: 100 INJECTION, SOLUTION INTRAVENOUS; SUBCUTANEOUS at 17:37

## 2019-12-09 RX ADMIN — INSULIN ASPART 3 UNITS: 100 INJECTION, SOLUTION INTRAVENOUS; SUBCUTANEOUS at 11:56

## 2019-12-09 RX ADMIN — AMPICILLIN SODIUM AND SULBACTAM SODIUM 3 G: 2; 1 INJECTION, POWDER, FOR SOLUTION INTRAMUSCULAR; INTRAVENOUS at 11:18

## 2019-12-09 RX ADMIN — ASPIRIN 325 MG: 325 TABLET, DELAYED RELEASE ORAL at 08:48

## 2019-12-09 ASSESSMENT — ACTIVITIES OF DAILY LIVING (ADL)
ADLS_ACUITY_SCORE: 17
ADLS_ACUITY_SCORE: 18
ADLS_ACUITY_SCORE: 18
ADLS_ACUITY_SCORE: 17

## 2019-12-09 NOTE — CONSULTS
Care Coordination:  CTS consulted to help pt with PCP set up and follow up. Pt was recently admitted from 12/2-12/4 for ankle fx after a fall. She had ORIF and was discharged back home. She is now readmitted with fevers and pain. She is being followed by Ortho and ID. Anticipate pt will discharge in next to 1-2 days. Met with pt at bedside to discuss plan of care and follow up. Pt states she is at home with her . Her chart indicates she has work comp. She states she is currently uninsured and will be getting insurance after Jan 1 through her work. She states she will follow up with her Ortho MD as recommended but prefers to not schedule or establish PCP until she gets insurance in Jan. Resources given to pt on free/sliding scale clinics. Pt does not anticipate any discharge needs at this time. She was thankful for the clinic resources.    Liza Maravilla RN BSN CM  Inpatient Care Coordination  [facility]  [phone]

## 2019-12-09 NOTE — PLAN OF CARE
Pt c/o pain in rt ankle x2 this shift. Medicated with PRN oxy with relief. Up to commode x2 with Ax1 for assistance with non-weight bearing rt ankle. ABX infused per orders. Hopeful to discharge today.

## 2019-12-09 NOTE — PROGRESS NOTES
[facility]  Infectious Disease Progress Note          Assessment and Plan:   Impression:  1. 43 y.o female who recently was seen by ortho for right ankle fracture after slipping on ice.   2. This was repaired with ORIF 6 days ago.   3. Now presenting with fevers, ankle pain.  No obvious infection.  Now afebrile and feeling well.  No cultures done       Recommendations:   1 will check blood cxs  2 will try stopping unasyn as no obvious inf         Interval History:   Now afebrile.  Feels back to her baseline.  Denies cough, abd pain, diarrhea, dysuria or sxs other than her original ankle discomfort.              Medications:       ampicillin-sulbactam (UNASYN) IV  3 g Intravenous Q6H     aspirin  325 mg Oral Daily     insulin aspart  1-7 Units Subcutaneous TID AC     insulin aspart  1-5 Units Subcutaneous At Bedtime     insulin glargine  20 Units Subcutaneous At Bedtime                  Physical Exam:   Blood pressure 139/72, pulse 97, temperature 98.8  F (37.1  C), temperature source Oral, resp. rate 18, weight 77.2 kg (170 lb 1.6 oz), SpO2 98 %, not currently breastfeeding.  [unfilled]  Vital Signs with Ranges  Temp:  [97.4  F (36.3  C)-99  F (37.2  C)] 98.8  F (37.1  C)  Heart Rate:  [77-95] 95  Resp:  [18-20] 18  BP: (136-148)/(72-80) 139/72  SpO2:  [98 %-99 %] 98 %    Constitutional: Awake, alert, cooperative, no apparent distress   Lungs: Clear to auscultation bilaterally, no crackles or wheezing   Cardiovascular: Regular rate and rhythm, normal S1 and S2, and no murmur noted   Abdomen: Normal bowel sounds, soft, non-distended, non-tender   Skin: No rashes, no cyanosis, no edema   Other: No R ankle redness          Data:   All microbiology laboratory data reviewed.  Recent Labs   Lab Test 12/09/19 0656 12/07/19 2248 12/04/19 0628   WBC 7.0 9.0 9.2   HGB 13.0 14.5 14.3   HCT 39.6 42.7 42.0   MCV 87 85 87    380 299     Recent Labs   Lab Test 12/09/19 0656 12/07/19 2248  12/02/19  1228   CR 0.45* 0.45* 0.51*     No lab results found.

## 2019-12-09 NOTE — PLAN OF CARE
A&OX4. LS diminished, but clear. Vitals stable. Up with assist of 2 with walker and gait belt. Right ankle brace on. No weight bearing to the right lower extremity. Oxycodone and tylenol for pain control. Pain went from 8/10 to 2/10. Ice applied.  , and 240. Scheduled Lantus and Novolog administered.  Mod carb diet. IV NS @100 ml/hr infusing. Plan to monitor for infection, pain control and possible discharge home tomorrow.  Continue POC.

## 2019-12-09 NOTE — PROGRESS NOTES
[facility]  Hospitalist Progress Note    Liliane Gomes MD, MD 12/09/2019        Reason for Stay (Diagnosis): Ankle pain and fever         Assessment and Plan:      Summary of Stay: Mikki Knox is a 43 year old female with history of diabetes mellitus on insulin therapy and recent surgery on her right ankle by Dr. Dominguez at [facility] 5 days ago who presents with postoperative fevers, chills and pain in her ankle.     Patient was admitted recently and underwent ORIF of her right ankle on 12/2.  Procedure was reportedly without complication.  Patient was discharged on 12/4.  She states that she was doing well postoperatively with pain under relatively good control until 12/7.  She states in the a.m. she started to have increased pain of her right ankle.  She also started to notice subjective fevers.  She did take her temperature at home and it was 100.3 and then 100.5. She denies any worsening leg swelling, shortness of breath, chest pain, palpitations.  She try to take her pain medication as prescribed but this did not per symptoms.  She presented to the ED for further evaluation     In the ED, patient tachycardic and febrile to 100.5.  CBC was within normal limits.  BMP notable for glucose of 408.  Lactic acid was normal.  Orthopedics was consulted in the ED and recommended admission and they will see the patient in the a.m.   Her cast was cut open in the ED and wound was noted to be healing well with no significant edema or streaking.  She was given Unasyn and IV fluids.     Postoperative fever: Concern for surgical site infection.  Patient without unilateral swelling, chest pain or shortness of breath making PE less likely. She is also been on full dose aspirin.  Patient febrile and tachycardic indicating sepsis.  Other vital signs within normal limits.  Lactic acid within normal limits. Concern for operative infection. Wound did appear clean without erythema or drainage. Orthopedics did request  admission and IV antibiotics which were administered in the ED.  -Will continue IV Unasyn. Further antibiotic recommendations per ID.   -Ortho not planning procedure.   -Continue home pain meds including acetaminophen, ibuprofen and oxycodone     DMII, uncontrolled:   -Patient glucose elevated in the ED to over 400.  This is atypical for her as she says prior to her injury her blood sugars were usually around the 140s.  -Despite resuming her home glargine dose of 16 units at night and treating with sliding scale insulin her blood sugars are still quite elevated and she has been n.p.o.  -Will continue Lantus and dosing of sliding scale insulin.     DVT Prophylaxis: Aspirin  Code Status: Full Code  Lines: None  Washburn: None  Disposition Plan   Expected discharge: Will discharge patient once cleared by surgery and ID       Entered: Liliane Gomes MD 12/09/2019, 1:24 PM     Incidental Findings/ Discharge Issues:  Discussed with the patient and her         Interval History (Subjective):      Patient seen and examined. She stated that she has improvement of her symptoms. She has no nausea or vomiting. She denies fever. She has no abdominal pain. No cough or shortness of breath.                   Physical Exam:      Last Vital Signs:  /72 (BP Location: Left arm)   Pulse 97   Temp 98.8  F (37.1  C) (Oral)   Resp 18   Wt 77.2 kg (170 lb 1.6 oz)   SpO2 98%   BMI 32.14 kg/m    Next 100.5 last night, now afebrile.    Vital signs reviewed  General:  Alert, calm, NAD  CV: regular rate and rhythm, no murmurs or rubs  Lungs:  Clear to ascultation bilaterally, normal respiratory effort  HEENT:  Pupil round, equal, conjuctivae, sclerae and lids normal, neck is supple  Abdomen:  Soft, nontender, nondistended, no masses, normal bowel sounds  Extremities: Right ankle wrapped.  Per report no significant erythema in the surgical site.  Neuro: normal strength and sensation in all 4 extremities, cranial nerves  grossly intact  Psychiatric:  Mood and affect within normal limits             Medications:      All current medications were reviewed with changes reflected in problem list.         Data:      All new lab and imaging data was reviewed.   Labs:

## 2019-12-09 NOTE — PLAN OF CARE
VSS on RA. Pain to RLQ, po oxycodone given with relief.  and 277. Non-weight bearing to RLE. Tolerating mod carb diet well, pt did report some nausea after eating but stated she ate too much and was no longer nauseous when writer found out. ID and ortho following. Plan for one more night to await BC. ID discontinued IV abx.

## 2019-12-10 ENCOUNTER — APPOINTMENT (OUTPATIENT)
Dept: PHYSICAL THERAPY | Facility: CLINIC | Age: 44
DRG: 862 | End: 2019-12-10
Attending: PHYSICIAN ASSISTANT
Payer: OTHER MISCELLANEOUS

## 2019-12-10 LAB
GLUCOSE BLDC GLUCOMTR-MCNC: 218 MG/DL (ref 70–99)
GLUCOSE BLDC GLUCOMTR-MCNC: 228 MG/DL (ref 70–99)
GLUCOSE BLDC GLUCOMTR-MCNC: 241 MG/DL (ref 70–99)
GLUCOSE BLDC GLUCOMTR-MCNC: 266 MG/DL (ref 70–99)
GLUCOSE BLDC GLUCOMTR-MCNC: 298 MG/DL (ref 70–99)

## 2019-12-10 PROCEDURE — 25000131 ZZH RX MED GY IP 250 OP 636 PS 637: Performed by: INTERNAL MEDICINE

## 2019-12-10 PROCEDURE — 12000000 ZZH R&B MED SURG/OB

## 2019-12-10 PROCEDURE — 97530 THERAPEUTIC ACTIVITIES: CPT | Mod: GP | Performed by: PHYSICAL THERAPIST

## 2019-12-10 PROCEDURE — 00000146 ZZHCL STATISTIC GLUCOSE BY METER IP

## 2019-12-10 PROCEDURE — 99232 SBSQ HOSP IP/OBS MODERATE 35: CPT | Performed by: INTERNAL MEDICINE

## 2019-12-10 PROCEDURE — 97161 PT EVAL LOW COMPLEX 20 MIN: CPT | Mod: GP | Performed by: PHYSICAL THERAPIST

## 2019-12-10 PROCEDURE — 25800030 ZZH RX IP 258 OP 636: Performed by: HOSPITALIST

## 2019-12-10 PROCEDURE — 25000132 ZZH RX MED GY IP 250 OP 250 PS 637: Performed by: HOSPITALIST

## 2019-12-10 PROCEDURE — 97116 GAIT TRAINING THERAPY: CPT | Mod: GP | Performed by: PHYSICAL THERAPIST

## 2019-12-10 RX ADMIN — OXYCODONE HYDROCHLORIDE 5 MG: 5 TABLET ORAL at 15:44

## 2019-12-10 RX ADMIN — OXYCODONE HYDROCHLORIDE 5 MG: 5 TABLET ORAL at 05:37

## 2019-12-10 RX ADMIN — OXYCODONE HYDROCHLORIDE 5 MG: 5 TABLET ORAL at 00:34

## 2019-12-10 RX ADMIN — INSULIN ASPART 2 UNITS: 100 INJECTION, SOLUTION INTRAVENOUS; SUBCUTANEOUS at 17:38

## 2019-12-10 RX ADMIN — OXYCODONE HYDROCHLORIDE 5 MG: 5 TABLET ORAL at 10:31

## 2019-12-10 RX ADMIN — OXYCODONE HYDROCHLORIDE 5 MG: 5 TABLET ORAL at 18:59

## 2019-12-10 RX ADMIN — SODIUM CHLORIDE: 9 INJECTION, SOLUTION INTRAVENOUS at 00:34

## 2019-12-10 RX ADMIN — INSULIN ASPART 2 UNITS: 100 INJECTION, SOLUTION INTRAVENOUS; SUBCUTANEOUS at 09:10

## 2019-12-10 RX ADMIN — POLYETHYLENE GLYCOL 3350 17 G: 17 POWDER, FOR SOLUTION ORAL at 14:01

## 2019-12-10 RX ADMIN — OXYCODONE HYDROCHLORIDE 5 MG: 5 TABLET ORAL at 22:30

## 2019-12-10 RX ADMIN — ASPIRIN 325 MG: 325 TABLET, DELAYED RELEASE ORAL at 08:11

## 2019-12-10 RX ADMIN — INSULIN ASPART 3 UNITS: 100 INJECTION, SOLUTION INTRAVENOUS; SUBCUTANEOUS at 12:58

## 2019-12-10 RX ADMIN — INSULIN GLARGINE 20 UNITS: 100 INJECTION, SOLUTION SUBCUTANEOUS at 21:53

## 2019-12-10 ASSESSMENT — ACTIVITIES OF DAILY LIVING (ADL)
ADLS_ACUITY_SCORE: 18
ADLS_ACUITY_SCORE: 18
ADLS_ACUITY_SCORE: 19

## 2019-12-10 NOTE — PLAN OF CARE
Physical Therapy Discharge Summary    Reason for therapy discharge:    All goals and outcomes met, no further needs identified.    Progress towards therapy goal(s). See goals on Care Plan in [facility] electronic health record for goal details.  Goals met    Therapy recommendation(s):    No further therapy is recommended.

## 2019-12-10 NOTE — PROGRESS NOTES
S/P ORIF Right ankle by Dr JAYNE Dominguez 12/2/2019    Patient pain controlled  Afebrile    Exam:  Incision healing well.  No drainage or erythema.  Swelling and ecchymosis present.  NVI    Plan:  Short leg cast applied at bedside  Non-WB Right LE  Pain medication as needed  Patient requesting wheelchair for long transfers  Able to discharge today if medically cleared.

## 2019-12-10 NOTE — PROGRESS NOTES
12/10/19 1417   Quick Adds   Type of Visit Initial PT Evaluation   Living Environment   Lives With spouse   Living Arrangements apartment   Home Accessibility other (see comments)  (long distances around apt building to get to elevator access)   Transportation Anticipated family or friend will provide   Living Environment Comment patient lives in an apt; too far of a distance to get to car or around the building to elevator access    Self-Care   Usual Activity Tolerance good   Current Activity Tolerance moderate   Equipment Currently Used at Home walker, rolling   Activity/Exercise/Self-Care Comment has been able to maintain NWB on R LE with use of walker; difficulty with longer distances   Functional Level Prior   Ambulation 1-->assistive equipment  (walker)   Transferring 1-->assistive equipment   Cognition 0 - no cognition issues reported   Fall history within last six months yes   Number of times patient has fallen within last six months 1   Which of the above functional risks had a recent onset or change? ambulation;transferring   Prior Functional Level Comment Pt reports indep in all ADLs, IADLs and mobility tasks with no AD at baseline. Pt works full-time as  at a [employer]. Recently has been using a walker and able to maintain NWB; needs a wheelchair for longer distances   General Information   Onset of Illness/Injury or Date of Surgery - Date 12/07/19   Referring Physician Jessenia Vargas, LUX   Patient/Family Goals Statement return home; wanting a wheelchair for longer distances   Pertinent History of Current Problem (include personal factors and/or comorbidities that impact the POC) S/P ORIF R ankle after fall on ice'; presented with fever and increased pain   Precautions/Limitations fall precautions   Weight-Bearing Status - RLE nonweight-bearing   General Observations patient in bed with R LE elevated   Cognitive Status Examination   Orientation orientation to person, place and time   Level  "of Consciousness alert   Follows Commands and Answers Questions 100% of the time   Personal Safety and Judgment intact   Memory intact   Pain Assessment   Patient Currently in Pain Yes, see Vital Sign flowsheet  (2/10)   Integumentary/Edema   Integumentary/Edema Comments R lower leg casted   Posture    Posture Comments WFL   Range of Motion (ROM)   ROM Comment R lower leg casted; others appear WFL   Strength   Strength Comments R lower leg limited by recent surgery and casting; others appear WFL   Bed Mobility   Bed Mobility Comments independent   Transfer Skills   Transfer Comments SBA with use of walker; able to maintain NWB on R   Gait   Gait Comments able to ambulate with walker > 25 feet with NWB on R and SBA   Balance   Balance Comments current need for a wlaker   General Therapy Interventions   Planned Therapy Interventions transfer training;gait training   Clinical Impression   Criteria for Skilled Therapeutic Intervention yes, treatment indicated   PT Diagnosis impaired gait/transfers   Influenced by the following impairments NWB on R LE; pain   Functional limitations due to impairments impaired independence with functional mobility   Clinical Presentation Evolving/Changing   Clinical Presentation Rationale clinical judgement   Clinical Decision Making (Complexity) Low complexity   Therapy Frequency Other (see comments)  (1x eval and treatment)   Predicted Duration of Therapy Intervention (days/wks) 1x eval and treatment   Anticipated Equipment Needs at Discharge wheelchair;front wheeled walker   Anticipated Discharge Disposition Home with Assist   Risk & Benefits of therapy have been explained Yes   Patient, Family & other staff in agreement with plan of care Yes   [employer] TM \"6 Clicks\"   2016, Trustees of [employer], under license to [employer].  All rights reserved.   6 Clicks Short Forms Basic Mobility Inpatient Short Form   [employer] AM-PAC  \"6 Clicks\" V.2 Basic " Mobility Inpatient Short Form   1. Turning from your back to your side while in a flat bed without using bedrails? 4 - None   2. Moving from lying on your back to sitting on the side of a flat bed without using bedrails? 4 - None   3. Moving to and from a bed to a chair (including a wheelchair)? 4 - None   4. Standing up from a chair using your arms (e.g., wheelchair, or bedside chair)? 4 - None   5. To walk in hospital room? 3 - A Little   6. Climbing 3-5 steps with a railing? 3 - A Little   Basic Mobility Raw Score (Score out of 24.Lower scores equate to lower levels of function) 22   Total Evaluation Time   Total Evaluation Time (Minutes) 10

## 2019-12-10 NOTE — PROGRESS NOTES
[facility]  Hospitalist Progress Note    Liliane Gomes MD, MD 12/10/2019        Reason for Stay (Diagnosis): Ankle pain and fever         Assessment and Plan:      Summary of Stay: Mikki Knox is a 43 year old female with history of diabetes mellitus on insulin therapy and recent surgery on her right ankle by Dr. Dominguez at [facility] 5 days ago who presents with postoperative fevers, chills and pain in her ankle.     Patient was admitted recently and underwent ORIF of her right ankle on 12/2.  Procedure was reportedly without complication.  Patient was discharged on 12/4.  She states that she was doing well postoperatively with pain under relatively good control until 12/7.  She states in the a.m. she started to have increased pain of her right ankle.  She also started to notice subjective fevers.  She did take her temperature at home and it was 100.3 and then 100.5. She denies any worsening leg swelling, shortness of breath, chest pain, palpitations.  She try to take her pain medication as prescribed but this did not per symptoms.  She presented to the ED for further evaluation     In the ED, patient tachycardic and febrile to 100.5.  CBC was within normal limits.  BMP notable for glucose of 408.  Lactic acid was normal.  Orthopedics was consulted in the ED and recommended admission and they will see the patient in the a.m.   Her cast was cut open in the ED and wound was noted to be healing well with no significant edema or streaking.  She was given Unasyn and IV fluids. Antibiotic was discontinued per ID.      Postoperative fever: Concern for surgical site infection.  Patient without unilateral swelling, chest pain or shortness of breath making PE less likely. She is also been on full dose aspirin.  Patient febrile and tachycardic indicating sepsis.  Other vital signs within normal limits.  Lactic acid within normal limits. Concern for operative infection. Wound did appear clean without erythema  or drainage. Orthopedics did request admission and IV antibiotics which were administered in the ED.  -Unasyn was discontinued per ID on 12/9. Will continue to monitor fever curve. Blood cultures negative     DMII, uncontrolled:   -Patient glucose elevated in the ED to over 400.  This is atypical for her as she says prior to her injury her blood sugars were usually around the 140s.  -Despite resuming her home glargine dose of 16 units at night and treating with sliding scale insulin her blood sugars are still quite elevated and she has been n.p.o.  -Will continue Lantus and dosing of sliding scale insulin.     DVT Prophylaxis: Aspirin  Code Status: Full Code  Lines: None  [place]: None  Disposition Plan   Expected discharge: Will discharge patient once cleared by surgery and ID       Entered: Liliane Gomes MD 12/10/2019, 1:06 PM     Incidental Findings/ Discharge Issues:  Discussed with the patient and her         Interval History (Subjective):      Patient seen and examined. She stated that she has improvement of her symptoms. She has no nausea or vomiting. She denies fever. She has no abdominal pain. No cough or shortness of breath.                   Physical Exam:      Last Vital Signs:  BP (!) 154/82 (BP Location: Left arm)   Pulse 97   Temp 97.6  F (36.4  C) (Oral)   Resp 18   Wt 75.3 kg (166 lb 1.6 oz)   SpO2 99%   BMI 31.38 kg/m    Next 100.5 last night, now afebrile.    Vital signs reviewed  General:  Alert, calm, NAD  CV: regular rate and rhythm, no murmurs or rubs  Lungs:  Clear to ascultation bilaterally, normal respiratory effort  HEENT:  Pupil round, equal, conjuctivae, sclerae and lids normal, neck is supple  Abdomen:  Soft, nontender, nondistended, no masses, normal bowel sounds  Extremities: Right ankle wrapped.  Per report no significant erythema in the surgical site.  Neuro: normal strength and sensation in all 4 extremities, cranial nerves grossly intact  Psychiatric:  Mood  and affect within normal limits             Medications:      All current medications were reviewed with changes reflected in problem list.         Data:      All new lab and imaging data was reviewed.   Labs:

## 2019-12-10 NOTE — PLAN OF CARE
/82 other VSS on RA. Pain to RLE, prn oxycodone x1 given with decrease in pain. Good appetite on mod carb diet.  and 266. Pivot to commode or bed, pt refused gait belt. No BM, pt reported slight constipation, prn miralax given. Ortho applied new cast today, CMS intact. ID recommending pt stay one more night as tmax overnight was 99.9 oral. ID will reasses CBC and blood cultures tomorrow.

## 2019-12-10 NOTE — PROGRESS NOTES
[facility]  Infectious Disease Progress Note          Assessment and Plan:   Impression:  1. 43 y.o female who recently was seen by ortho for right ankle fracture after slipping on ice.   2. This was repaired with ORIF 6 days ago.   3. Now presenting with fevers, ankle pain.  No obvious infection.  Now afebrile and feeling well.  No cultures done       Recommendations:   1 cont off abs  2 will cont to monitor temp and f/u on blood cxs tomorrow.        Interval History:   Tmax 99.9.  Afebrile thus far today.  No ankle pain or other c/o.  Blood cxs NGTD.              Medications:       aspirin  325 mg Oral Daily     insulin aspart  1-7 Units Subcutaneous TID AC     insulin aspart  1-5 Units Subcutaneous At Bedtime     insulin glargine  20 Units Subcutaneous At Bedtime                  Physical Exam:   Blood pressure (!) 154/82, pulse 97, temperature 97.4  F (36.3  C), temperature source Axillary, resp. rate 18, weight 75.3 kg (166 lb 1.6 oz), SpO2 99 %, not currently breastfeeding.  [unfilled]  Vital Signs with Ranges  Temp:  [97.4  F (36.3  C)-99.9  F (37.7  C)] 97.4  F (36.3  C)  Heart Rate:  [78-95] 78  Resp:  [18] 18  BP: (135-166)/(72-82) 154/82  SpO2:  [97 %-99 %] 99 %    Constitutional: Awake, alert, cooperative, no apparent distress   Lungs: Clear to auscultation bilaterally, no crackles or wheezing   Cardiovascular: Regular rate and rhythm, normal S1 and S2, and no murmur noted   Abdomen: Normal bowel sounds, soft, non-distended, non-tender   Skin: No rashes, no cyanosis, no edema   Other: No R ankle redness          Data:   All microbiology laboratory data reviewed.  Recent Labs   Lab Test 12/09/19  0656 12/07/19 2248 12/04/19  0628   WBC 7.0 9.0 9.2   HGB 13.0 14.5 14.3   HCT 39.6 42.7 42.0   MCV 87 85 87    380 299     Recent Labs   Lab Test 12/09/19  0656 12/07/19 2248 12/02/19  1228   CR 0.45* 0.45* 0.51*     No lab results found.

## 2019-12-10 NOTE — PROVIDER NOTIFICATION
"MD paged: \"Pt has low grade temp @ 99.7. PRN Tylenol given. RLE is warm to touch, pt is concerned and wondering if abx need to be restarted. Please advise as needed, thanks!\"    Addendum: MD visited and evaluated pt in person, educated pt on POC.   "

## 2019-12-10 NOTE — PLAN OF CARE
A&O X4. VSS on RA; BP elevated; Tmax 99.8. Assist of 1; non-wt bearing on RLE. Mod Carb diet. IVF @ 100mL/hr. RLE CMS intact; dressing CDI. RLE edema 1+. Blood cultures pending. C/O of pain; PRN Oxycodone given X2. . ID and Ortho following. Plan to determine antibiotics plan with new blood cultures. Continue to monitor.

## 2019-12-10 NOTE — PROGRESS NOTES
Care Coordination:  Call received from PT informing CTS of pt's need for wheelchair on discharge. Order is in chart. Met with pt at bedside regarding need for wheelchair on discharge. Pt states this is part of her [employer] case. She has been working with a couple of people from [employer] from her previous admission. After her last admission, [employer] had gotten her crutches and a walker that she was using at home. Call placed and voicemail left with Cristal [phone] from Work comp regarding pt hospitalization and need for wheelchair. Call also placed to Mira Calhoun  with [employer] [phone]. We discussed pt's hospitalization and orders for wheelchair on discharge. She requested orders for wheelchair to be faxed to fax #: [phone]. She will take it from here and make sure wheelchair gets delivered to pt at home. Pt states she is able to discharge home tomorrow with use of her crutches, walker and assist from  and will be able to wait for wheelchair at home.    Liza Maravilla RN BSN CM  Inpatient Care Coordination  [facility]  [phone]

## 2019-12-10 NOTE — PLAN OF CARE
Discharge Planner PT   Patient plan for discharge: return home with assist of spouse tomorrow  Current status: PT: Order received; 1x evaluation and treatment completed to evaluate patient for knee scooter vs. Wheelchair. Patient had R ankle ORIF on 12/2 and presented to hospital with post op fevers, chills and pain; Had been using a walker or crutches at home for short distances in apt but having difficulty doing longer distances; Elevator at apt building is a long distance from apt. On eval patient noted to be able to maintain NWB short distances with walker; no LOB; trial of knee scooter but too painful for patient with use of cast. Patient would benefit from an 18x18 wheelchair rental for elevating and extendable leg rests for longer distance use; Care Coordinator contacted as MD had put in order already and arrangements to be coordinated by SW/care coordinator. No further PT needs indicated at this time. Order completed.  Barriers to return to prior living situation: none with use of walker for shorter distances and wheelchair for longer distances  Recommendations for discharge: Home with assist of spouse as needed for mobility and cares while NWB on R LE; use of walker in home and wheelchair for longer distances  Rationale for recommendations: Patient mobilizing well with a walker short distances only; unable to use knee scooter without significant pain; would do better with a wheelchair rental for longer distances (being arranged by care coordinator);        Entered by: Sharri Hackett 12/10/2019 4:44 PM

## 2019-12-10 NOTE — PLAN OF CARE
A/O x 4. VSS on RA, afebrile. C/O 5/10 pain to RLE, PRN PO oxycodone 5 mg given twice this shift w/ relief.  and 298. No tele, denied CP . LS clear, no SOB reported. PIV to right lost when pt was showering, writer attempted new PIV placement, unsuccessful x 2. Per MD pt care order no IV access needed at this time. Up to bathroom SBA w/ walker, maintains non-weightbearing right foot and ambulates well. Good UOP, pt reported BM this afternoon.  at bedside. Continue POC.

## 2019-12-10 NOTE — PLAN OF CARE
A/O x 4. VSS on RA except elevated /81, recheck 135/72 and m-temp 99.9. PRN PO tylenol 650 mg given. C/O 6/10 pain to RLE, PRN PO oxycodone 5 mg given twice this shift w/ sl improvement.  and 242. No tele, denied CP . LS clear, no SOB reported. PIV to right intact, infusing w/ NS @ 100 ml/hr. Up to bedside commode (pivots) Ax1, maintains non-weightbearing on RLE. Good UOP.  at bedside. Will continue POC.

## 2019-12-11 VITALS
HEART RATE: 85 BPM | WEIGHT: 166.1 LBS | DIASTOLIC BLOOD PRESSURE: 83 MMHG | RESPIRATION RATE: 16 BRPM | SYSTOLIC BLOOD PRESSURE: 138 MMHG | BODY MASS INDEX: 31.38 KG/M2 | OXYGEN SATURATION: 98 % | TEMPERATURE: 97.9 F

## 2019-12-11 LAB
ANION GAP SERPL CALCULATED.3IONS-SCNC: 6 MMOL/L (ref 3–14)
BASOPHILS # BLD AUTO: 0.1 10E9/L (ref 0–0.2)
BASOPHILS NFR BLD AUTO: 0.8 %
BUN SERPL-MCNC: 8 MG/DL (ref 7–30)
CALCIUM SERPL-MCNC: 8.7 MG/DL (ref 8.5–10.1)
CHLORIDE SERPL-SCNC: 101 MMOL/L (ref 94–109)
CO2 SERPL-SCNC: 28 MMOL/L (ref 20–32)
CREAT SERPL-MCNC: 0.58 MG/DL (ref 0.52–1.04)
DIFFERENTIAL METHOD BLD: NORMAL
EOSINOPHIL # BLD AUTO: 0.2 10E9/L (ref 0–0.7)
EOSINOPHIL NFR BLD AUTO: 3.4 %
ERYTHROCYTE [DISTWIDTH] IN BLOOD BY AUTOMATED COUNT: 12 % (ref 10–15)
GFR SERPL CREATININE-BSD FRML MDRD: >90 ML/MIN/{1.73_M2}
GLUCOSE BLDC GLUCOMTR-MCNC: 215 MG/DL (ref 70–99)
GLUCOSE BLDC GLUCOMTR-MCNC: 241 MG/DL (ref 70–99)
GLUCOSE BLDC GLUCOMTR-MCNC: 264 MG/DL (ref 70–99)
GLUCOSE SERPL-MCNC: 238 MG/DL (ref 70–99)
HBA1C MFR BLD: 9.5 % (ref 0–5.6)
HCT VFR BLD AUTO: 42.1 % (ref 35–47)
HGB BLD-MCNC: 13.7 G/DL (ref 11.7–15.7)
IMM GRANULOCYTES # BLD: 0 10E9/L (ref 0–0.4)
IMM GRANULOCYTES NFR BLD: 0.3 %
LYMPHOCYTES # BLD AUTO: 2 10E9/L (ref 0.8–5.3)
LYMPHOCYTES NFR BLD AUTO: 32.8 %
MCH RBC QN AUTO: 28.3 PG (ref 26.5–33)
MCHC RBC AUTO-ENTMCNC: 32.5 G/DL (ref 31.5–36.5)
MCV RBC AUTO: 87 FL (ref 78–100)
MONOCYTES # BLD AUTO: 0.4 10E9/L (ref 0–1.3)
MONOCYTES NFR BLD AUTO: 7 %
NEUTROPHILS # BLD AUTO: 3.4 10E9/L (ref 1.6–8.3)
NEUTROPHILS NFR BLD AUTO: 55.7 %
NRBC # BLD AUTO: 0 10*3/UL
NRBC BLD AUTO-RTO: 0 /100
PLATELET # BLD AUTO: 392 10E9/L (ref 150–450)
POTASSIUM SERPL-SCNC: 3.9 MMOL/L (ref 3.4–5.3)
RBC # BLD AUTO: 4.84 10E12/L (ref 3.8–5.2)
SODIUM SERPL-SCNC: 135 MMOL/L (ref 133–144)
WBC # BLD AUTO: 6.1 10E9/L (ref 4–11)

## 2019-12-11 PROCEDURE — 00000146 ZZHCL STATISTIC GLUCOSE BY METER IP

## 2019-12-11 PROCEDURE — 99239 HOSP IP/OBS DSCHRG MGMT >30: CPT | Performed by: INTERNAL MEDICINE

## 2019-12-11 PROCEDURE — 25000132 ZZH RX MED GY IP 250 OP 250 PS 637: Performed by: HOSPITALIST

## 2019-12-11 PROCEDURE — 36415 COLL VENOUS BLD VENIPUNCTURE: CPT | Performed by: INTERNAL MEDICINE

## 2019-12-11 PROCEDURE — 85025 COMPLETE CBC W/AUTO DIFF WBC: CPT | Performed by: INTERNAL MEDICINE

## 2019-12-11 PROCEDURE — 83036 HEMOGLOBIN GLYCOSYLATED A1C: CPT | Performed by: INTERNAL MEDICINE

## 2019-12-11 PROCEDURE — 99207 ZZC CDG-CODE INCORRECT PER BILLING BASED ON TIME: CPT | Performed by: INTERNAL MEDICINE

## 2019-12-11 PROCEDURE — 80048 BASIC METABOLIC PNL TOTAL CA: CPT | Performed by: INTERNAL MEDICINE

## 2019-12-11 RX ORDER — METFORMIN HCL 500 MG
500 TABLET, EXTENDED RELEASE 24 HR ORAL
Start: 2019-12-11

## 2019-12-11 RX ADMIN — OXYCODONE HYDROCHLORIDE 5 MG: 5 TABLET ORAL at 08:57

## 2019-12-11 RX ADMIN — INSULIN ASPART 3 UNITS: 100 INJECTION, SOLUTION INTRAVENOUS; SUBCUTANEOUS at 08:55

## 2019-12-11 RX ADMIN — ASPIRIN 325 MG: 325 TABLET, DELAYED RELEASE ORAL at 08:57

## 2019-12-11 RX ADMIN — OXYCODONE HYDROCHLORIDE 5 MG: 5 TABLET ORAL at 04:38

## 2019-12-11 ASSESSMENT — ACTIVITIES OF DAILY LIVING (ADL)
ADLS_ACUITY_SCORE: 18
ADLS_ACUITY_SCORE: 18
ADLS_ACUITY_SCORE: 19
ADLS_ACUITY_SCORE: 18

## 2019-12-11 NOTE — DISCHARGE SUMMARY
[facility]    Discharge Summary  Hospitalist    Date of Admission:  12/7/2019  Date of Discharge:  12/11/2019  Discharging Provider: Liliane Gomes MD  Date of Service (when I saw the patient): 12/11/19    Discharge Diagnoses      Postoperative fever: Concern for surgical site infection    DMII, uncontrolled    History of Present Illness   Mikki Knox is an 43 year old female who presented with postoperative fevers, chills and pain in her ankle    Hospital Course   Summary of Stay: Mikki Knox is a 43 year old female with history of diabetes mellitus on insulin therapy and recent surgery on her right ankle by Dr. Dominguez at [facility] [place] 5 days ago who presents with postoperative fevers, chills and pain in her ankle.     Patient was admitted recently and underwent ORIF of her right ankle on 12/2. Procedure was reportedly without complication.  Patient was discharged on 12/4. She states that she was doing well postoperatively with pain under relatively good control until 12/7.  She states in the a.m. she started to have increased pain of her right ankle. She also started to notice subjective fevers.  She did take her temperature at home and it was 100.3 and then 100.5. She denies any worsening leg swelling, shortness of breath, chest pain, palpitations. She tried to take her pain medication as prescribed but this did not per symptoms.  She presented to the ED for further evaluation     In the ED, patient tachycardic and febrile to 100.5.  CBC was within normal limits. BMP notable for glucose of 408. Lactic acid was normal.  Orthopedics was consulted in the ED and recommended admission and they will see the patient in the a.m.   Her cast was cut open in the ED and wound was noted to be healing well with no significant edema or streaking. She was given Unasyn and IV fluids. Antibiotic was discontinued per ID.      Postoperative fever: Concern for surgical site infection.  Patient without unilateral  swelling, chest pain or shortness of breath making PE less likely. She is also been on full dose aspirin.  Patient febrile and tachycardic indicating sepsis.  Other vital signs within normal limits.  Lactic acid within normal limits. Concern for operative infection. Wound did appear clean without erythema or drainage. Orthopedics did request admission and IV antibiotics which were administered in the ED.  -Unasyn was discontinued per ID on 12/9. Fevere resolved. Cultures remained negative     DMII, uncontrolled:   -Patient glucose elevated in the ED to over 400.  This is atypical for her as she says prior to her injury her blood sugars were usually around the 140s.  -Despite resuming her home glargine dose of 16 units at night and treating with sliding scale insulin her blood sugars were high. Lantus insulin was increased to 20 units daily.She was started on metformin  mg po daily and gradualy increase to 1000 mg bid    Significant Results and Procedures   Results for orders placed or performed during the hospital encounter of 12/02/19   Ankle XR, G/E 3 views, right    Narrative    RIGHT ANKLE THREE OR MORE VIEWS 12/2/2019 10:52 AM     HISTORY: Deformed, fall.      Impression    IMPRESSION: Right ankle fracture-dislocation. The talus along with the  distal fibula are posterolaterally dislocated. Fibular distal  diaphyseal oblique, slightly comminuted fracture is noted with marked  widening between the distal tibia and fibula indicating  syndesmosis/interosseous injury. On the lateral view, there is a  probable small posterior malleolar fracture. The medial malleolus is  only obliquely imaged but most likely intact. The deltoid ligament is  therefore likely completely torn.    MELISSA KOVACS MD   CT Ankle Right w/o Contrast    Narrative    CT RIGHT ANKLE WITHOUT CONTRAST 12/2/2019 1:12 PM     HISTORY: Fracture, ankle.     Radiation dose for this scan was reduced using automated exposure  control, adjustment of  the mA and/or kV according to patient size, or  iterative reconstruction technique.    FINDINGS: Since earlier today, the tibiotalar joint dislocation has  been reduced. The tibiotalar joint remains incongruent with widening  between the lateral aspect of the tibial plafond and talar dome and  between the medial malleolus and talus. There is also continued  widening between the anterior aspect of the tibia and distal fibula.  1.1 x 0.3 x 0.4 cm anterior joint line articular body is noted between  the tibia and talus. A smaller linear calcification or articular body  is also suspected. There is a fracture along the posterior margin of  the posterior malleolus without evidence of articular surface  involvement. The medial malleolus appears to be intact. Fibular distal  diaphyseal oblique comminuted fracture is noted 6 cm proximal to the  tibiotalar joint. Lateral malleolar anterior spurring is noted which  could be related to chronic injury of the anterior talofibular  ligament. No talar dome osteochondral lesion is identified. There are  small calcaneal spurs at the Achilles tendon and plantar aponeurosis  attachments. Subtalar joint alignment appears grossly normal.      Impression    IMPRESSION:  1. Unstable tibiotalar joint injury with syndesmosis and interosseous  ligament tearing exiting through the fibula approximately 6 cm  proximal to the tibiotalar joint. There is also complete tear of the  deltoid ligament with widening between the medial malleolus and talus  but no apparent medial malleolar fracture.  2. Tibiotalar anterior joint line 1.1 x 0.3 x 0.4 cm articular body.  The donor site for this bone fragment is uncertain. A smaller  articular body is also suspected at the anterior joint line as well.  3. Posterior malleolar fracture without apparent involvement of the  articular surface.  4. 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  5. Small  bubbles of gas along the lateral margin of the tibial distal  diametaphysis. It is unknown if the patient has a penetrating injury.    MELISSA KOVACS MD   XR Surgery AL Fluoro L/T 5 Min w Stills    Narrative    This exam was marked as non-reportable because it will not be read by a   radiologist or a [facility] non-radiologist provider.                      Pending Results   None  Code Status   Full Code       Primary Care Physician   Physician No Ref-Primary        Discharge Disposition   Discharged to home  Condition at discharge: Stable    Consultations This Hospital Stay   ORTHOPEDIC SURGERY IP CONSULT  INFECTIOUS DISEASES IP CONSULT  CARE COORDINATOR IP CONSULT  PHYSICAL THERAPY ADULT IP CONSULT    Time Spent on this Encounter   I, Liliane Gomse MD, MD, personally saw the patient today and spent greater than 30 minutes discharging this patient.    Discharge Orders      Discharge Equipment: Wheelchair    The patient has a mobility limitation that significantly impairs his/her ability to participate in one or more mobility-related activities of daily living (MRADLs).  The patient's mobility limitation cannot be sufficiently resolved by the use of an appropriately fitted cane or walker  The patient's home provides adequate access between rooms, maneuvering space and surfaces for the use of the manual wheelchair provided.    **Patient requires 18x18 wheelchair with elevating and extendable leg rests   Use of a manual wheelchair will significantly improve the patient's ability to participate in MRADLs and the patient will use it on a regular basis in the home  The patient has not expressed an unwillingness to use the manual wheelchair that is provided in the home.  The patient has sufficient upper extremity function and other physical and mental capabilities needed to safely self-propel the manual wheelchair that is provided in the home during a typical day, OR the patient has a caregiver who is available,  willing and able to provide assistance with the wheelchair when the patient has limitations.    Treatment Diagnosis:     Reason for your hospital stay    Postop fever-resolved     Follow-up and recommended labs and tests     Follow up with primary care provider, Physician No Ref-Primary, within 7 days   Follow up with oncology as scheduled     Activity    Your activity upon discharge: activity as tolerated     Diet    Follow this diet upon discharge: Orders Placed This Encounter      Combination Diet [id number] Calories: Moderate Consistent CHO (4-6 CHO units/meal)     Discharge Medications   Current Discharge Medication List      START taking these medications    Details   metFORMIN (GLUCOPHAGE-XR) 500 MG 24 hr tablet Take 1 tablet (500 mg) by mouth daily (with dinner)    Comments: If no symptoms of diarrhea, you can increase dose by 500 mg till reach goal of 1000 mg twice daily.  Associated Diagnoses: Type 2 diabetes mellitus without complication, with long-term current use of insulin (H)      !! order for DME Equipment being ordered: Wheelchair  Qty: 1 Units, Refills: 0    Associated Diagnoses: Ankle fracture, bimalleolar, closed, right, initial encounter       !! - Potential duplicate medications found. Please discuss with provider.      CONTINUE these medications which have CHANGED    Details   insulin glargine (LANTUS PEN) 100 UNIT/ML pen Inject 20 Units Subcutaneous At Bedtime  Qty: 5 mL, Refills: 0    Comments: If Lantus is not covered by insurance, may substitute Basaglar at same dose and frequency.    Associated Diagnoses: Type 2 diabetes mellitus without complication, with long-term current use of insulin (H)         CONTINUE these medications which have NOT CHANGED    Details   acetaminophen (TYLENOL) 325 MG tablet Take 3 tablets (975 mg) by mouth every 8 hours  Qty: 60 tablet, Refills: 0    Associated Diagnoses: Closed trimalleolar fracture of right ankle, initial encounter      aspirin (ASA) 325 MG EC  tablet Take 1 tablet (325 mg) by mouth daily  Qty: 45 tablet, Refills: 0    Associated Diagnoses: Closed trimalleolar fracture of right ankle, initial encounter      ondansetron (ZOFRAN-ODT) 4 MG ODT tab Take 1 tablet (4 mg) by mouth every 6 hours as needed for nausea or vomiting  Qty: 10 tablet, Refills: 0    Associated Diagnoses: Closed trimalleolar fracture of right ankle, initial encounter      oxyCODONE (ROXICODONE) 5 MG tablet Take 1-2 tablets (5-10 mg) by mouth every 3 hours as needed for severe pain  Qty: 45 tablet, Refills: 0    Associated Diagnoses: Closed trimalleolar fracture of right ankle, initial encounter      polyethylene glycol (MIRALAX/GLYCOLAX) packet Take 17 g by mouth daily as needed for constipation  Qty: 30 packet, Refills: 0    Associated Diagnoses: Closed trimalleolar fracture of right ankle, initial encounter      !! order for DME Equipment being ordered: Bath Transfer Bench ()  Treatment Diagnosis: Impaired transfers/mobility  Qty: 1 each, Refills: 0    Associated Diagnoses: Ankle fracture, bimalleolar, closed, right, initial encounter      !! order for DME Equipment being ordered: Walker Wheels (), Walker () and Crutches ()  Treatment Diagnosis: difficulty walking  Qty: 1 each, Refills: 0    Associated Diagnoses: Ankle fracture, bimalleolar, closed, right, initial encounter       !! - Potential duplicate medications found. Please discuss with provider.      STOP taking these medications       ibuprofen (ADVIL/MOTRIN) 200 MG tablet Comments:   Reason for Stopping:               Allergies   No Known Allergies  Data   Most Recent 3 CBC's:  Recent Labs   Lab Test 12/11/19  0656 12/09/19  0656 12/07/19  2248   WBC 6.1 7.0 9.0   HGB 13.7 13.0 14.5   MCV 87 87 85    321 380      Most Recent 3 BMP's:  Recent Labs   Lab Test 12/11/19  0656 12/09/19  0656 12/07/19  2248    137 132*   POTASSIUM 3.9 3.5 3.8   CHLORIDE 101 104 98   CO2 28 25 25   BUN 8 8 14   CR 0.58  0.45* 0.45*   ANIONGAP 6 8 9   LUCERO 8.7 8.1* 8.9   * 225* 408*     Most Recent 2 LFT's:  Recent Labs   Lab Test 02/11/17  2328 04/13/13  0909   AST 59* 54*   ALT 92* 52*   ALKPHOS 66 61   BILITOTAL 0.3 0.4     Most Recent INR's and Anticoagulation Dosing History:  Anticoagulation Dose History     There is no flowsheet data to display.        Most Recent 3 Troponin's:No lab results found.  Most Recent Cholesterol Panel:  Recent Labs   Lab Test 04/13/13  0909   CHOL 160   LDL 95   HDL 41*   TRIG 118     Most Recent 6 Bacteria Isolates From Any Culture (See EPIC Reports for Culture Details):  Recent Labs   Lab Test 12/09/19  1519 12/09/19  1518 02/12/17  0115 12/07/16  1730   CULT No growth after 2 days No growth after 2 days No Beta Streptococcus isolated No Beta Streptococcus isolated     Most Recent TSH, T4 and A1c Labs:  Recent Labs   Lab Test 12/11/19  0656  07/31/12  0737   TSH  --   --  2.61   A1C 9.5*   < >  --     < > = values in this interval not displayed.

## 2019-12-11 NOTE — PLAN OF CARE
Pt discharged home with  as transport.  Discharge instructions reviewed with patient and spouse, they verbalized understanding and all questions Pt taken off unit via wheelchair.  Pt discharged with all personal belongings and discharge medications.

## 2019-12-11 NOTE — PROVIDER NOTIFICATION
"MD paged: \"Pt lost PIV access, hard stick. No scheduled IV meds. Do you want new PIV placed or is pt ok w/o? Thanks!\"    Addendum: MD placed pt care order that ok for pt to be w/o PIV at this time. Will continue to monitor.   "

## 2019-12-11 NOTE — PROGRESS NOTES
[facility]  Infectious Disease Progress Note          Assessment and Plan:   Impression:  1. 43 y.o female who recently was seen by ortho for right ankle fracture after slipping on ice.   2. This was repaired with ORIF 6 days ago.   3. Now presenting with fevers, ankle pain.  No obvious infection.  Now afebrile and feeling well.  Blood cultures neg.       Recommendations:   1 ok for home off abs from our standpoint.        Interval History:   Remains afebrile off abs.  No ankle pain or other c/o.  Blood cxs NGTD.              Medications:       aspirin  325 mg Oral Daily     insulin aspart  1-7 Units Subcutaneous TID AC     insulin aspart  1-5 Units Subcutaneous At Bedtime     insulin glargine  20 Units Subcutaneous At Bedtime                  Physical Exam:   Blood pressure 138/83, pulse 85, temperature 97.9  F (36.6  C), temperature source Oral, resp. rate 16, weight 75.3 kg (166 lb 1.6 oz), SpO2 98 %, not currently breastfeeding.  [unfilled]  Vital Signs with Ranges  Temp:  [97.1  F (36.2  C)-98.7  F (37.1  C)] 97.9  F (36.6  C)  Pulse:  [85] 85  Heart Rate:  [75-86] 75  Resp:  [16-18] 16  BP: (135-141)/(75-83) 138/83  SpO2:  [97 %-100 %] 98 %    Constitutional: Awake, alert, cooperative, no apparent distress   Lungs: Clear to auscultation bilaterally, no crackles or wheezing   Cardiovascular: Regular rate and rhythm, normal S1 and S2, and no murmur noted   Abdomen: Normal bowel sounds, soft, non-distended, non-tender   Skin: No rashes, no cyanosis, no edema   Other: No R ankle redness          Data:   All microbiology laboratory data reviewed.  Recent Labs   Lab Test 12/11/19  0656 12/09/19  0656 12/07/19  2248   WBC 6.1 7.0 9.0   HGB 13.7 13.0 14.5   HCT 42.1 39.6 42.7   MCV 87 87 85    321 380     Recent Labs   Lab Test 12/11/19  0656 12/09/19  0656 12/07/19  2248   CR 0.58 0.45* 0.45*     No lab results found.

## 2019-12-11 NOTE — PLAN OF CARE
A&O X4. VSS on RA; BP elevated; afebrile. Assist of 1; non-wt bearing on RLE. Mod Carb diet. No PIV access. RLE CMS intact; dressing CDI. RLE edema 1+. Blood cultures pending. C/O of pain; PRN Oxycodone given X1. . ID and Ortho following. Plan to discharge today pending blood cultures. Continue to monitor.

## 2019-12-15 LAB
BACTERIA SPEC CULT: NO GROWTH
BACTERIA SPEC CULT: NO GROWTH
Lab: NORMAL
Lab: NORMAL
SPECIMEN SOURCE: NORMAL
SPECIMEN SOURCE: NORMAL

## 2019-12-19 NOTE — DISCHARGE SUMMARY
Discharge Summary    Mikki Knox MRN# [id number]   YOB: 1975 Age: 44 year old     Date of Admission:  12/2/2019  Date of Discharge:  12/4/2019  2:55 PM  Admitting Physician:  Rinku Dominguez MD  Discharge Physician:  Rinku Dominguez MD     Primary Provider: No Ref-Primary, [id number]          Admission Diagnoses:    right bimalleolar ankle fracture          Discharge Diagnosis:   Same           Surgical Procedure:   internal fixaton rightbimalleolar ankle fracture           Secondary Diagnosis:     Patient Active Problem List   Diagnosis     Hyperlipidemia LDL goal <100     Diabetes mellitus, type 2 (H)     DM type 2, goal A1C 7-8     Tubo-ovarian abscess     Ovarian cyst     Ankle fracture, bimalleolar, closed, right, initial encounter     Closed right ankle fracture     Fever              Discharge Disposition:   Discharged to home           Medications Prior to Admission:     No medications prior to admission.             Discharge Medications:     Discharge Medication List as of 12/4/2019  4:11 PM      START taking these medications    Details   !! acetaminophen (TYLENOL) 325 MG tablet Take 3 tablets (975 mg) by mouth every 8 hours, Disp-60 tablet, R-0, Local Print      aspirin (ASA) 325 MG EC tablet Take 1 tablet (325 mg) by mouth daily, Disp-45 tablet, R-0, Local Print      ondansetron (ZOFRAN-ODT) 4 MG ODT tab Take 1 tablet (4 mg) by mouth every 6 hours as needed for nausea or vomiting, Disp-10 tablet, R-0, Local Print      !! order for DME Equipment being ordered: Bath Transfer Bench ()  Treatment Diagnosis: Impaired transfers/mobilityDisp-1 each, R-0, Local Print      !! order for DME Equipment being ordered: Walker Wheels (), Walker () and Crutches ()  Treatment Diagnosis: difficulty walkingDisp-1 each, R-0, Local Print      oxyCODONE (ROXICODONE) 5 MG tablet Take 1-2 tablets (5-10 mg) by mouth every 3 hours as needed for severe pain, Disp-45 tablet, R-0, Local  Print      polyethylene glycol (MIRALAX/GLYCOLAX) packet Take 17 g by mouth daily as needed for constipation, Disp-30 packet, R-0, Local Print      !! acetaminophen (TYLENOL) 325 MG tablet Take 2 tablets (650 mg) by mouth every 6 hours as needed for pain, Disp-60 tablet, R-0, Local Print      insulin glargine (LANTUS PEN) 100 UNIT/ML pen Inject 16 Units Subcutaneous At Bedtime, Disp-5 mL, R-0, E-PrescribeIf Lantus is not covered by insurance, may substitute Basaglar at same dose and frequency.         !! - Potential duplicate medications found. Please discuss with provider.      CONTINUE these medications which have NOT CHANGED    Details   ibuprofen (ADVIL/MOTRIN) 200 MG tablet Take 200 mg by mouth every 6 hours as needed for mild pain, Historical         STOP taking these medications       triamcinolone (KENALOG) 0.1 % external ointment Comments:   Reason for Stopping:                     Consultations:   Consultation during this admission received from internal medicine           Hospital Course:   The patient was admitted after the surgical procedure. The patient underwent an uneventfulinternal fixaton rightbimalleolar ankle fracture. Postoperatively, anticoagulation  asa. nO transfusion was required. The patient will be discharged to home. Home medications have been reconciled. oxycodone 5 mg. was prescribed for pain. asa  will be prescribed.             Pending Results:   None           Discharge Instructions and Follow-Up:        Discharge activity: Activity as tolerated   Discharge follow-up: Follow up with me in 2 weeks   Outpatient therapy: None    Home Care agency: None    Supplies and equipment: None        Wound care: leave dressing in place   Other instructions: None

## 2021-04-05 ENCOUNTER — MEDICAL CORRESPONDENCE (OUTPATIENT)
Dept: HEALTH INFORMATION MANAGEMENT | Facility: CLINIC | Age: 46
End: 2021-04-05

## 2021-04-05 ENCOUNTER — TRANSFERRED RECORDS (OUTPATIENT)
Dept: HEALTH INFORMATION MANAGEMENT | Facility: CLINIC | Age: 46
End: 2021-04-05

## 2021-04-20 ENCOUNTER — TRANSFERRED RECORDS (OUTPATIENT)
Dept: HEALTH INFORMATION MANAGEMENT | Facility: CLINIC | Age: 46
End: 2021-04-20

## 2021-04-20 LAB
ALT SERPL-CCNC: 58 IU/L (ref 0–32)
AST SERPL-CCNC: 39 IU/L (ref 0–40)
CHOLESTEROL (EXTERNAL): 192 MG/DL (ref 100–199)
CREATININE (EXTERNAL): 0.56 MG/DL (ref 0.57–1)
GFR ESTIMATED (EXTERNAL): 115 ML/MIN/1.73
GFR ESTIMATED (IF AFRICAN AMERICAN) (EXTERNAL): 132 ML/MIN/1.73
GLUCOSE (EXTERNAL): 227 MG/DL (ref 65–99)
HDLC SERPL-MCNC: 42 MG/DL
LDL CHOLESTEROL CALCULATED (EXTERNAL): 125 MG/DL (ref 0–99)
POTASSIUM (EXTERNAL): 4.1 MMOL/L (ref 3.5–5.2)
TRIGLYCERIDES (EXTERNAL): 137 MG/DL (ref 0–149)

## (undated) DEVICE — SU VICRYL 2-0 CT-1 27" UND J259H

## (undated) DEVICE — NDL 22GA 1.5"

## (undated) DEVICE — STPL SKIN PROXIMATE 35 WIDE PMW35

## (undated) DEVICE — SOL WATER IRRIG 1000ML BOTTLE 07139-09

## (undated) DEVICE — PACK LOWER EXTREMITY RIDGES

## (undated) DEVICE — DRILL BIT QUICK COUPLING 2.5X110MM GOLD 310.25

## (undated) DEVICE — GLOVE PROTEXIS BLUE W/NEU-THERA 8.5  2D73EB85

## (undated) DEVICE — CAST PADDING 4" STERILE CS9044

## (undated) DEVICE — BNDG ELASTIC 4"X5YDS UNSTERILE 6611-40

## (undated) DEVICE — DRSG GAUZE 4X4" TRAY

## (undated) DEVICE — BNDG ELASTIC 4" DBL LENGTH UNSTERILE 6611-14

## (undated) DEVICE — GLOVE PROTEXIS W/NEU-THERA 8.5  2D73TE85

## (undated) DEVICE — GLOVE PROTEXIS POWDER FREE 7.5 ORTHOPEDIC 2D73ET75

## (undated) DEVICE — GLOVE PROTEXIS POWDER FREE SMT 7.5  2D72PT75X

## (undated) DEVICE — BAG CLEAR TRASH 1.3M 39X33" P4040C

## (undated) DEVICE — SU VICRYL 0 CP-1 27" J467H

## (undated) DEVICE — GLOVE PROTEXIS BLUE W/NEU-THERA 7.0  2D73EB70

## (undated) DEVICE — DRAPE C-ARM 60X42" 1013

## (undated) DEVICE — CAST PLASTER SPLINT XTRA FAST 5X30" 7392

## (undated) DEVICE — ESU GROUND PAD ADULT W/CORD E7507

## (undated) DEVICE — SUCTION CANISTER MEDIVAC LINER 3000ML W/LID 65651-530

## (undated) DEVICE — DRAPE STERI U 1015

## (undated) DEVICE — PREP CHLORAPREP 26ML TINTED ORANGE  260815

## (undated) DEVICE — CAST PADDING 4" UNSTERILE 9044

## (undated) DEVICE — LINEN FULL SHEET 5511

## (undated) DEVICE — Device

## (undated) DEVICE — TOURNIQUET CUFF 30" REPRO BLUE 60-7070-105

## (undated) DEVICE — LINEN HALF SHEET 5512

## (undated) DEVICE — DRILL BIT SYN QUICK COUPLING 3.5X110MM 310.35

## (undated) DEVICE — CAST PADDING 4" STERILE 9044S

## (undated) DEVICE — LINEN ORTHO ACL PACK 5447

## (undated) RX ORDER — FENTANYL CITRATE 50 UG/ML
INJECTION, SOLUTION INTRAMUSCULAR; INTRAVENOUS
Status: DISPENSED
Start: 2019-12-02

## (undated) RX ORDER — EPHEDRINE SULFATE 50 MG/ML
INJECTION, SOLUTION INTRAMUSCULAR; INTRAVENOUS; SUBCUTANEOUS
Status: DISPENSED
Start: 2019-12-02

## (undated) RX ORDER — NEOSTIGMINE METHYLSULFATE 1 MG/ML
VIAL (ML) INJECTION
Status: DISPENSED
Start: 2019-12-02

## (undated) RX ORDER — BUPIVACAINE HYDROCHLORIDE 5 MG/ML
INJECTION, SOLUTION EPIDURAL; INTRACAUDAL
Status: DISPENSED
Start: 2019-12-02

## (undated) RX ORDER — LIDOCAINE HYDROCHLORIDE 10 MG/ML
INJECTION, SOLUTION EPIDURAL; INFILTRATION; INTRACAUDAL; PERINEURAL
Status: DISPENSED
Start: 2019-12-02

## (undated) RX ORDER — GLYCOPYRROLATE 0.2 MG/ML
INJECTION INTRAMUSCULAR; INTRAVENOUS
Status: DISPENSED
Start: 2019-12-02

## (undated) RX ORDER — DEXAMETHASONE SODIUM PHOSPHATE 4 MG/ML
INJECTION, SOLUTION INTRA-ARTICULAR; INTRALESIONAL; INTRAMUSCULAR; INTRAVENOUS; SOFT TISSUE
Status: DISPENSED
Start: 2019-12-02

## (undated) RX ORDER — PROPOFOL 10 MG/ML
INJECTION, EMULSION INTRAVENOUS
Status: DISPENSED
Start: 2019-12-02

## (undated) RX ORDER — ONDANSETRON 2 MG/ML
INJECTION INTRAMUSCULAR; INTRAVENOUS
Status: DISPENSED
Start: 2019-12-02

## (undated) RX ORDER — SCOLOPAMINE TRANSDERMAL SYSTEM 1 MG/1
PATCH, EXTENDED RELEASE TRANSDERMAL
Status: DISPENSED
Start: 2019-12-02

## (undated) RX ORDER — VANCOMYCIN HYDROCHLORIDE 1 G/20ML
INJECTION, POWDER, LYOPHILIZED, FOR SOLUTION INTRAVENOUS
Status: DISPENSED
Start: 2019-12-02

## (undated) RX ORDER — KETAMINE HCL IN 0.9 % NACL 50 MG/5 ML
SYRINGE (ML) INTRAVENOUS
Status: DISPENSED
Start: 2019-12-02

## (undated) RX ORDER — CEFAZOLIN SODIUM 2 G/100ML
INJECTION, SOLUTION INTRAVENOUS
Status: DISPENSED
Start: 2019-12-02